# Patient Record
Sex: MALE | Race: BLACK OR AFRICAN AMERICAN | Employment: UNEMPLOYED | URBAN - METROPOLITAN AREA
[De-identification: names, ages, dates, MRNs, and addresses within clinical notes are randomized per-mention and may not be internally consistent; named-entity substitution may affect disease eponyms.]

---

## 2019-05-24 ENCOUNTER — APPOINTMENT (OUTPATIENT)
Dept: GENERAL RADIOLOGY | Age: 59
DRG: 065 | End: 2019-05-24
Attending: EMERGENCY MEDICINE
Payer: MEDICARE

## 2019-05-24 ENCOUNTER — APPOINTMENT (OUTPATIENT)
Dept: CT IMAGING | Age: 59
DRG: 065 | End: 2019-05-24
Attending: EMERGENCY MEDICINE
Payer: MEDICARE

## 2019-05-24 ENCOUNTER — HOSPITAL ENCOUNTER (INPATIENT)
Age: 59
LOS: 3 days | Discharge: HOME | DRG: 065 | End: 2019-05-27
Attending: EMERGENCY MEDICINE | Admitting: INTERNAL MEDICINE
Payer: MEDICARE

## 2019-05-24 ENCOUNTER — APPOINTMENT (OUTPATIENT)
Dept: MRI IMAGING | Age: 59
DRG: 065 | End: 2019-05-24
Attending: EMERGENCY MEDICINE
Payer: MEDICARE

## 2019-05-24 DIAGNOSIS — I10 MALIGNANT HYPERTENSION: Primary | ICD-10-CM

## 2019-05-24 DIAGNOSIS — R29.898 WEAKNESS OF RIGHT LOWER EXTREMITY: ICD-10-CM

## 2019-05-24 DIAGNOSIS — I63.9 CEREBROVASCULAR ACCIDENT (CVA), UNSPECIFIED MECHANISM (HCC): ICD-10-CM

## 2019-05-24 LAB
ALBUMIN SERPL-MCNC: 3.5 G/DL (ref 3.5–5)
ALBUMIN/GLOB SERPL: 0.7 {RATIO} (ref 1.1–2.2)
ALP SERPL-CCNC: 72 U/L (ref 45–117)
ALT SERPL-CCNC: 27 U/L (ref 12–78)
ANION GAP SERPL CALC-SCNC: 5 MMOL/L (ref 5–15)
AST SERPL-CCNC: 21 U/L (ref 15–37)
ATRIAL RATE: 85 BPM
BASOPHILS # BLD: 0.1 K/UL (ref 0–0.1)
BASOPHILS NFR BLD: 1 % (ref 0–1)
BILIRUB SERPL-MCNC: 0.6 MG/DL (ref 0.2–1)
BUN SERPL-MCNC: 12 MG/DL (ref 6–20)
BUN/CREAT SERPL: 11 (ref 12–20)
CALCIUM SERPL-MCNC: 8.6 MG/DL (ref 8.5–10.1)
CALCULATED P AXIS, ECG09: 32 DEGREES
CALCULATED R AXIS, ECG10: 0 DEGREES
CALCULATED T AXIS, ECG11: 110 DEGREES
CHLORIDE SERPL-SCNC: 105 MMOL/L (ref 97–108)
CK SERPL-CCNC: 152 U/L (ref 39–308)
CO2 SERPL-SCNC: 28 MMOL/L (ref 21–32)
CREAT SERPL-MCNC: 1.14 MG/DL (ref 0.7–1.3)
DIAGNOSIS, 93000: NORMAL
DIFFERENTIAL METHOD BLD: ABNORMAL
EOSINOPHIL # BLD: 0.2 K/UL (ref 0–0.4)
EOSINOPHIL NFR BLD: 3 % (ref 0–7)
ERYTHROCYTE [DISTWIDTH] IN BLOOD BY AUTOMATED COUNT: 15.5 % (ref 11.5–14.5)
GLOBULIN SER CALC-MCNC: 4.7 G/DL (ref 2–4)
GLUCOSE BLD STRIP.AUTO-MCNC: 100 MG/DL (ref 65–100)
GLUCOSE SERPL-MCNC: 94 MG/DL (ref 65–100)
HCT VFR BLD AUTO: 42.4 % (ref 36.6–50.3)
HGB BLD-MCNC: 13.7 G/DL (ref 12.1–17)
IMM GRANULOCYTES # BLD AUTO: 0 K/UL (ref 0–0.04)
IMM GRANULOCYTES NFR BLD AUTO: 0 % (ref 0–0.5)
INR PPP: 1.1 (ref 0.9–1.1)
LYMPHOCYTES # BLD: 1.2 K/UL (ref 0.8–3.5)
LYMPHOCYTES NFR BLD: 19 % (ref 12–49)
MAGNESIUM SERPL-MCNC: 2 MG/DL (ref 1.6–2.4)
MCH RBC QN AUTO: 27.7 PG (ref 26–34)
MCHC RBC AUTO-ENTMCNC: 32.3 G/DL (ref 30–36.5)
MCV RBC AUTO: 85.7 FL (ref 80–99)
MONOCYTES # BLD: 0.6 K/UL (ref 0–1)
MONOCYTES NFR BLD: 9 % (ref 5–13)
NEUTS SEG # BLD: 4.5 K/UL (ref 1.8–8)
NEUTS SEG NFR BLD: 68 % (ref 32–75)
NRBC # BLD: 0 K/UL (ref 0–0.01)
NRBC BLD-RTO: 0 PER 100 WBC
P-R INTERVAL, ECG05: 156 MS
PLATELET # BLD AUTO: 238 K/UL (ref 150–400)
PMV BLD AUTO: 10.3 FL (ref 8.9–12.9)
POTASSIUM SERPL-SCNC: 3.3 MMOL/L (ref 3.5–5.1)
PROT SERPL-MCNC: 8.2 G/DL (ref 6.4–8.2)
PROTHROMBIN TIME: 11.2 SEC (ref 9–11.1)
Q-T INTERVAL, ECG07: 406 MS
QRS DURATION, ECG06: 80 MS
QTC CALCULATION (BEZET), ECG08: 483 MS
RBC # BLD AUTO: 4.95 M/UL (ref 4.1–5.7)
SERVICE CMNT-IMP: NORMAL
SODIUM SERPL-SCNC: 138 MMOL/L (ref 136–145)
TROPONIN I SERPL-MCNC: 0.06 NG/ML
VENTRICULAR RATE, ECG03: 85 BPM
WBC # BLD AUTO: 6.6 K/UL (ref 4.1–11.1)

## 2019-05-24 PROCEDURE — 74011000258 HC RX REV CODE- 258: Performed by: INTERNAL MEDICINE

## 2019-05-24 PROCEDURE — 74011000258 HC RX REV CODE- 258: Performed by: EMERGENCY MEDICINE

## 2019-05-24 PROCEDURE — 94762 N-INVAS EAR/PLS OXIMTRY CONT: CPT

## 2019-05-24 PROCEDURE — 96375 TX/PRO/DX INJ NEW DRUG ADDON: CPT

## 2019-05-24 PROCEDURE — 82962 GLUCOSE BLOOD TEST: CPT

## 2019-05-24 PROCEDURE — 74011000250 HC RX REV CODE- 250: Performed by: INTERNAL MEDICINE

## 2019-05-24 PROCEDURE — 84484 ASSAY OF TROPONIN QUANT: CPT

## 2019-05-24 PROCEDURE — 83735 ASSAY OF MAGNESIUM: CPT

## 2019-05-24 PROCEDURE — 82550 ASSAY OF CK (CPK): CPT

## 2019-05-24 PROCEDURE — 85025 COMPLETE CBC W/AUTO DIFF WBC: CPT

## 2019-05-24 PROCEDURE — 65660000000 HC RM CCU STEPDOWN

## 2019-05-24 PROCEDURE — 96365 THER/PROPH/DIAG IV INF INIT: CPT

## 2019-05-24 PROCEDURE — 93005 ELECTROCARDIOGRAM TRACING: CPT

## 2019-05-24 PROCEDURE — 74011250637 HC RX REV CODE- 250/637: Performed by: INTERNAL MEDICINE

## 2019-05-24 PROCEDURE — 36415 COLL VENOUS BLD VENIPUNCTURE: CPT

## 2019-05-24 PROCEDURE — 80053 COMPREHEN METABOLIC PANEL: CPT

## 2019-05-24 PROCEDURE — 99285 EMERGENCY DEPT VISIT HI MDM: CPT

## 2019-05-24 PROCEDURE — 74011250636 HC RX REV CODE- 250/636: Performed by: INTERNAL MEDICINE

## 2019-05-24 PROCEDURE — 70450 CT HEAD/BRAIN W/O DYE: CPT

## 2019-05-24 PROCEDURE — 74011000250 HC RX REV CODE- 250: Performed by: EMERGENCY MEDICINE

## 2019-05-24 PROCEDURE — 85610 PROTHROMBIN TIME: CPT

## 2019-05-24 PROCEDURE — 71046 X-RAY EXAM CHEST 2 VIEWS: CPT

## 2019-05-24 RX ORDER — POTASSIUM CHLORIDE 750 MG/1
40 TABLET, FILM COATED, EXTENDED RELEASE ORAL
Status: COMPLETED | OUTPATIENT
Start: 2019-05-24 | End: 2019-05-24

## 2019-05-24 RX ORDER — IBUPROFEN 200 MG
400 TABLET ORAL
COMMUNITY
End: 2019-05-27

## 2019-05-24 RX ORDER — CLOPIDOGREL BISULFATE 75 MG/1
75 TABLET ORAL DAILY
Status: DISCONTINUED | OUTPATIENT
Start: 2019-05-25 | End: 2019-05-24

## 2019-05-24 RX ORDER — ACETAMINOPHEN 650 MG/1
650 SUPPOSITORY RECTAL
Status: DISCONTINUED | OUTPATIENT
Start: 2019-05-24 | End: 2019-05-27 | Stop reason: HOSPADM

## 2019-05-24 RX ORDER — ACETAMINOPHEN 325 MG/1
650 TABLET ORAL
Status: DISCONTINUED | OUTPATIENT
Start: 2019-05-24 | End: 2019-05-27 | Stop reason: HOSPADM

## 2019-05-24 RX ORDER — HEPARIN SODIUM 5000 [USP'U]/ML
5000 INJECTION, SOLUTION INTRAVENOUS; SUBCUTANEOUS EVERY 8 HOURS
Status: DISCONTINUED | OUTPATIENT
Start: 2019-05-24 | End: 2019-05-27 | Stop reason: HOSPADM

## 2019-05-24 RX ORDER — AMLODIPINE BESYLATE 5 MG/1
5 TABLET ORAL DAILY
Status: DISCONTINUED | OUTPATIENT
Start: 2019-05-24 | End: 2019-05-25

## 2019-05-24 RX ORDER — SODIUM CHLORIDE 0.9 % (FLUSH) 0.9 %
5-40 SYRINGE (ML) INJECTION AS NEEDED
Status: DISCONTINUED | OUTPATIENT
Start: 2019-05-24 | End: 2019-05-27 | Stop reason: HOSPADM

## 2019-05-24 RX ORDER — LABETALOL HYDROCHLORIDE 5 MG/ML
5 INJECTION, SOLUTION INTRAVENOUS
Status: DISCONTINUED | OUTPATIENT
Start: 2019-05-24 | End: 2019-05-25

## 2019-05-24 RX ORDER — SODIUM CHLORIDE 0.9 % (FLUSH) 0.9 %
5-40 SYRINGE (ML) INJECTION AS NEEDED
Status: DISCONTINUED | OUTPATIENT
Start: 2019-05-24 | End: 2019-05-26 | Stop reason: SDUPTHER

## 2019-05-24 RX ORDER — LABETALOL HYDROCHLORIDE 5 MG/ML
20 INJECTION, SOLUTION INTRAVENOUS
Status: COMPLETED | OUTPATIENT
Start: 2019-05-24 | End: 2019-05-24

## 2019-05-24 RX ORDER — SODIUM CHLORIDE 0.9 % (FLUSH) 0.9 %
5-40 SYRINGE (ML) INJECTION EVERY 8 HOURS
Status: DISCONTINUED | OUTPATIENT
Start: 2019-05-24 | End: 2019-05-26 | Stop reason: SDUPTHER

## 2019-05-24 RX ORDER — SODIUM CHLORIDE 0.9 % (FLUSH) 0.9 %
5-40 SYRINGE (ML) INJECTION EVERY 8 HOURS
Status: DISCONTINUED | OUTPATIENT
Start: 2019-05-24 | End: 2019-05-27 | Stop reason: HOSPADM

## 2019-05-24 RX ORDER — GUAIFENESIN 100 MG/5ML
81 LIQUID (ML) ORAL DAILY
Status: DISCONTINUED | OUTPATIENT
Start: 2019-05-24 | End: 2019-05-27 | Stop reason: HOSPADM

## 2019-05-24 RX ORDER — ACETAMINOPHEN 500 MG
1000 TABLET ORAL
COMMUNITY

## 2019-05-24 RX ORDER — NICARDIPINE HYDROCHLORIDE 0.1 MG/ML
5-15 INJECTION INTRAVENOUS
Status: DISCONTINUED | OUTPATIENT
Start: 2019-05-24 | End: 2019-05-24 | Stop reason: SDUPTHER

## 2019-05-24 RX ORDER — ONDANSETRON 2 MG/ML
4 INJECTION INTRAMUSCULAR; INTRAVENOUS
Status: DISCONTINUED | OUTPATIENT
Start: 2019-05-24 | End: 2019-05-27 | Stop reason: HOSPADM

## 2019-05-24 RX ORDER — ATORVASTATIN CALCIUM 40 MG/1
40 TABLET, FILM COATED ORAL
Status: DISCONTINUED | OUTPATIENT
Start: 2019-05-24 | End: 2019-05-27 | Stop reason: HOSPADM

## 2019-05-24 RX ORDER — METOPROLOL TARTRATE 25 MG/1
25 TABLET, FILM COATED ORAL EVERY 12 HOURS
Status: DISCONTINUED | OUTPATIENT
Start: 2019-05-24 | End: 2019-05-24

## 2019-05-24 RX ORDER — METOPROLOL TARTRATE 25 MG/1
25 TABLET, FILM COATED ORAL EVERY 12 HOURS
Status: DISCONTINUED | OUTPATIENT
Start: 2019-05-24 | End: 2019-05-26

## 2019-05-24 RX ADMIN — ASPIRIN 81 MG 81 MG: 81 TABLET ORAL at 19:02

## 2019-05-24 RX ADMIN — AMLODIPINE BESYLATE 5 MG: 5 TABLET ORAL at 19:02

## 2019-05-24 RX ADMIN — SODIUM CHLORIDE 7.5 MG/HR: 900 INJECTION, SOLUTION INTRAVENOUS at 23:47

## 2019-05-24 RX ADMIN — SODIUM CHLORIDE 10 MG/HR: 900 INJECTION, SOLUTION INTRAVENOUS at 19:06

## 2019-05-24 RX ADMIN — HEPARIN SODIUM 5000 UNITS: 5000 INJECTION INTRAVENOUS; SUBCUTANEOUS at 21:50

## 2019-05-24 RX ADMIN — Medication 10 ML: at 21:51

## 2019-05-24 RX ADMIN — ATORVASTATIN CALCIUM 40 MG: 40 TABLET, FILM COATED ORAL at 21:50

## 2019-05-24 RX ADMIN — SODIUM CHLORIDE 7.5 MG/HR: 900 INJECTION, SOLUTION INTRAVENOUS at 19:19

## 2019-05-24 RX ADMIN — Medication 10 ML: at 14:00

## 2019-05-24 RX ADMIN — METOPROLOL TARTRATE 25 MG: 25 TABLET ORAL at 19:02

## 2019-05-24 RX ADMIN — SODIUM CHLORIDE 7.5 MG/HR: 900 INJECTION, SOLUTION INTRAVENOUS at 18:39

## 2019-05-24 RX ADMIN — Medication 10 ML: at 21:50

## 2019-05-24 RX ADMIN — LABETALOL HYDROCHLORIDE 20 MG: 5 INJECTION, SOLUTION INTRAVENOUS at 14:34

## 2019-05-24 RX ADMIN — POTASSIUM CHLORIDE 40 MEQ: 750 TABLET, EXTENDED RELEASE ORAL at 19:02

## 2019-05-24 RX ADMIN — SODIUM CHLORIDE 10 MG/HR: 900 INJECTION, SOLUTION INTRAVENOUS at 23:24

## 2019-05-24 RX ADMIN — SODIUM CHLORIDE 5 MG/HR: 900 INJECTION, SOLUTION INTRAVENOUS at 16:41

## 2019-05-24 NOTE — ED TRIAGE NOTES
64 y/o AA male with only known PMH of hypertension presents with the cc of sudden onset of dizziness (worse with orthostatic positional changes) and right leg \"numbness\" and weakness yesterday around 2:00 pm. He is currently on no medications and has not seen a health care provider in over one year. He denies drugs/EtOH. Patient also denies chest pain, palpitations, diplopia, visual field cuts, head trauma, fever/chills, dysphagia, and syncopal spells.   Initial NIH scored as a 4:  1 pt - sensory loss (right lower extremity)  1 pt - slight drift in right arm  1 pt - slight drift in right leg  1 pt - mild aphasia/slurred speech    CODE S level II called on arrival

## 2019-05-24 NOTE — ED PROVIDER NOTES
EMERGENCY DEPARTMENT HISTORY AND PHYSICAL EXAM          Date: 5/24/2019  Patient Name: Janki Galarza    History of Presenting Illness     Chief Complaint   Patient presents with    Dysarthria    Dizziness    Extremity Weakness       History Provided By: Patient and EMS    HPI: Janki Galarza is a 63 y.o. male, pmhx hypertension, sleep apnea, asthma, who presents via EMS to the ED c/o elevated blood pressure  Patient states he has been feeling dizzy and off since yesterday afternoon around 2 PM.  He notes he is having some weakness in his right leg with some weird sensations as well as some mild trouble with his speech and gait. He denies any headache, blurry vision, chest pain, abdominal pain, nausea vomiting diarrhea he notes that he has been told in the past he had blood pressure and he was supposed to follow-up with his primary care physician but he did not. He does note he went to a doctor's office 1 year ago and there is no comment made of his blood pressure. PCP: None    Allergies: NSAIDs  Social Hx: previous tobacco, -EtOH, -current Illicit Drugs    There are no other complaints, changes, or physical findings at this time.      Current Facility-Administered Medications   Medication Dose Route Frequency Provider Last Rate Last Dose    sodium chloride (NS) flush 5-40 mL  5-40 mL IntraVENous Q8H Shawna Causey MD   10 mL at 05/24/19 1400    sodium chloride (NS) flush 5-40 mL  5-40 mL IntraVENous PRN Shawna Causey MD        sodium chloride (NS) flush 5-40 mL  5-40 mL IntraVENous Q8H Sera Perez MD        sodium chloride (NS) flush 5-40 mL  5-40 mL IntraVENous PRN Nabil Brunson MD        ondansetron [facility]) injection 4 mg  4 mg IntraVENous Q6H PRN Nabil Brunson MD        labetalol (NORMODYNE;TRANDATE) injection 5 mg  5 mg IntraVENous Q10MIN PRN Nabil Brunson MD        acetaminophen (TYLENOL) tablet 650 mg  650 mg Oral Q4H PRN Jose L Borrego MD LOKESH        Or    acetaminophen (TYLENOL) solution 650 mg  650 mg Per NG tube Q4H PRN Brooke Perez MD        Or    acetaminophen (TYLENOL) suppository 650 mg  650 mg Rectal Q4H PRN Jennifer Galicia MD        [START ON 2019] clopidogrel (PLAVIX) tablet 75 mg  75 mg Oral DAILY Brooke Perez MD        niCARdipine (CARDENE) 25 mg in 0.9% sodium chloride 250 mL infusion  5-15 mg/hr IntraVENous TITRATE Brooke Perez MD        heparin (porcine) injection 5,000 Units  5,000 Units SubCUTAneous Q8H Brooke Perez MD        amLODIPine (NORVASC) tablet 5 mg  5 mg Oral DAILY Brooke Perez MD        metoprolol tartrate (LOPRESSOR) tablet 25 mg  25 mg Oral Q12H Brooke Perez MD        atorvastatin (LIPITOR) tablet 40 mg  40 mg Oral QHS Jennifer Galicia MD         Current Outpatient Medications   Medication Sig Dispense Refill    ibuprofen (MOTRIN) 200 mg tablet Take 400 mg by mouth every six (6) hours as needed for Pain.  acetaminophen (TYLENOL) 500 mg tablet Take 1,000 mg by mouth every six (6) hours as needed for Pain.  aspirin/caffeine (BC PO) Take 1 Tab by mouth daily as needed for Pain (Aches).  Multivit-Iron-Min-Folic Acid (CENTRUM COMPLETE) 18-0.4 mg Tab Take 1 Tab by mouth daily. Past History     Past Medical History:  Past Medical History:   Diagnosis Date    Asthma     Bell's palsy     Gum disease     Hypertension     Obstructive sleep apnea (adult) (pediatric) 2013    Obstructive sleep apnea on CPAP 2013       Past Surgical History:  No past surgical history on file.     Family History:  Family History   Problem Relation Age of Onset    Hypertension Father     Alcohol abuse Father        Social History:  Social History     Tobacco Use    Smoking status: Former Smoker     Packs/day: 1.00     Last attempt to quit: 1995     Years since quittin.3    Smokeless tobacco: Never Used   Substance Use Topics  Alcohol use: No    Drug use: Yes     Types: Marijuana     Comment: as teenager        Allergies: Allergies   Allergen Reactions    Nsaids (Non-Steroidal Anti-Inflammatory Drug) Other (comments)     wheezing         Review of Systems   Review of Systems   Constitutional: Negative for activity change, appetite change, chills, fever and unexpected weight change. HENT: Negative for congestion. Eyes: Negative for pain and visual disturbance. Respiratory: Negative for cough and shortness of breath. Cardiovascular: Negative for chest pain. Gastrointestinal: Negative for abdominal pain, diarrhea, nausea and vomiting. Genitourinary: Negative for dysuria. Musculoskeletal: Negative for back pain. Skin: Negative for rash. Neurological: Positive for dizziness, weakness and numbness. Negative for headaches. Physical Exam   Physical Exam   Constitutional: He is oriented to person, place, and time. He appears well-developed and well-nourished. This is a tall overweight middle-aged male currently in mild to moderate distress due to symptoms   HENT:   Head: Normocephalic and atraumatic. Mouth/Throat: Oropharynx is clear and moist.   Eyes: Pupils are equal, round, and reactive to light. Conjunctivae and EOM are normal. Right eye exhibits no discharge. Left eye exhibits no discharge. Neck: Normal range of motion. Neck supple. Cardiovascular: Normal rate, regular rhythm and normal heart sounds. No murmur heard. Pulmonary/Chest: Effort normal and breath sounds normal. No respiratory distress. He has no wheezes. He has no rales. Abdominal: Soft. Bowel sounds are normal. He exhibits no distension. There is no tenderness. Musculoskeletal: Normal range of motion. He exhibits no edema. Neurological: He is alert and oriented to person, place, and time. No cranial nerve deficit or sensory deficit. He exhibits normal muscle tone.  Coordination and gait (Noted to be dragging his right leg with difficulty picking it up) abnormal. GCS eye subscore is 4. GCS verbal subscore is 5. GCS motor subscore is 6. Right lower extremity with mild weakness compared to the left side partially 2-3 out of 5   Skin: Skin is warm and dry. No rash noted. He is not diaphoretic. Nursing note and vitals reviewed.     Diagnostic Study Results     Labs -     Recent Results (from the past 12 hour(s))   GLUCOSE, POC    Collection Time: 05/24/19  1:29 PM   Result Value Ref Range    Glucose (POC) 100 65 - 100 mg/dL    Performed by Carmen Garcia    EKG, 12 LEAD, INITIAL    Collection Time: 05/24/19  1:53 PM   Result Value Ref Range    Ventricular Rate 85 BPM    Atrial Rate 85 BPM    P-R Interval 156 ms    QRS Duration 80 ms    Q-T Interval 406 ms    QTC Calculation (Bezet) 483 ms    Calculated P Axis 32 degrees    Calculated R Axis 0 degrees    Calculated T Axis 110 degrees    Diagnosis       Normal sinus rhythm  Possible Left atrial enlargement  Left ventricular hypertrophy  T wave abnormality, consider lateral ischemia  Prolonged QT  When compared with ECG of 13-FEB-2013 15:38,  premature ventricular complexes are no longer present  premature atrial complexes are no longer present  T wave inversion now evident in Lateral leads     TROPONIN I    Collection Time: 05/24/19  1:55 PM   Result Value Ref Range    Troponin-I, Qt. 0.06 (H) <0.05 ng/mL   CBC WITH AUTOMATED DIFF    Collection Time: 05/24/19  1:55 PM   Result Value Ref Range    WBC 6.6 4.1 - 11.1 K/uL    RBC 4.95 4.10 - 5.70 M/uL    HGB 13.7 12.1 - 17.0 g/dL    HCT 42.4 36.6 - 50.3 %    MCV 85.7 80.0 - 99.0 FL    MCH 27.7 26.0 - 34.0 PG    MCHC 32.3 30.0 - 36.5 g/dL    RDW 15.5 (H) 11.5 - 14.5 %    PLATELET 257 666 - 796 K/uL    MPV 10.3 8.9 - 12.9 FL    NRBC 0.0 0  WBC    ABSOLUTE NRBC 0.00 0.00 - 0.01 K/uL    NEUTROPHILS 68 32 - 75 %    LYMPHOCYTES 19 12 - 49 %    MONOCYTES 9 5 - 13 %    EOSINOPHILS 3 0 - 7 %    BASOPHILS 1 0 - 1 %    IMMATURE GRANULOCYTES 0 0.0 - 0.5 % ABS. NEUTROPHILS 4.5 1.8 - 8.0 K/UL    ABS. LYMPHOCYTES 1.2 0.8 - 3.5 K/UL    ABS. MONOCYTES 0.6 0.0 - 1.0 K/UL    ABS. EOSINOPHILS 0.2 0.0 - 0.4 K/UL    ABS. BASOPHILS 0.1 0.0 - 0.1 K/UL    ABS. IMM. GRANS. 0.0 0.00 - 0.04 K/UL    DF AUTOMATED     METABOLIC PANEL, COMPREHENSIVE    Collection Time: 05/24/19  1:55 PM   Result Value Ref Range    Sodium 138 136 - 145 mmol/L    Potassium 3.3 (L) 3.5 - 5.1 mmol/L    Chloride 105 97 - 108 mmol/L    CO2 28 21 - 32 mmol/L    Anion gap 5 5 - 15 mmol/L    Glucose 94 65 - 100 mg/dL    BUN 12 6 - 20 MG/DL    Creatinine 1.14 0.70 - 1.30 MG/DL    BUN/Creatinine ratio 11 (L) 12 - 20      GFR est AA >60 >60 ml/min/1.73m2    GFR est non-AA >60 >60 ml/min/1.73m2    Calcium 8.6 8.5 - 10.1 MG/DL    Bilirubin, total 0.6 0.2 - 1.0 MG/DL    ALT (SGPT) 27 12 - 78 U/L    AST (SGOT) 21 15 - 37 U/L    Alk. phosphatase 72 45 - 117 U/L    Protein, total 8.2 6.4 - 8.2 g/dL    Albumin 3.5 3.5 - 5.0 g/dL    Globulin 4.7 (H) 2.0 - 4.0 g/dL    A-G Ratio 0.7 (L) 1.1 - 2.2     CK W/ REFLX CKMB    Collection Time: 05/24/19  1:55 PM   Result Value Ref Range     39 - 308 U/L   MAGNESIUM    Collection Time: 05/24/19  1:55 PM   Result Value Ref Range    Magnesium 2.0 1.6 - 2.4 mg/dL   PROTHROMBIN TIME + INR    Collection Time: 05/24/19  1:55 PM   Result Value Ref Range    INR 1.1 0.9 - 1.1      Prothrombin time 11.2 (H) 9.0 - 11.1 sec       Radiologic Studies -   XR CHEST PA LAT   Final Result   No acute intrathoracic disease. CT CODE NEURO HEAD WO CONTRAST   Final Result   1.    1. Small age-indeterminate infarct in the left basal ganglia/internal capsule,   but new since 2012. Consider brain MRI for further evaluation. 2. 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 Mild   chronic microvascular ischemic disease.       23X            MRI BRAIN W WO CONT    (Results Pending)   MRA BRAIN WO CONT    (Results Pending)     CT Results  (Last 48 hours) 05/24/19 1344  CT CODE NEURO HEAD WO CONTRAST Final result    Impression:  1.    1. Small age-indeterminate infarct in the left basal ganglia/internal capsule,   but new since 2012. Consider brain MRI for further evaluation. 2. 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 Mild   chronic microvascular ischemic disease. 23X               Narrative:  EXAM:  CT CODE NEURO HEAD WO CONTRAST       INDICATION:   htn dizziness weakness       COMPARISON: CT head 12/6/2012. TECHNIQUE: Unenhanced CT of the head was performed using 5 mm images. Brain and   bone windows were generated. CT dose reduction was achieved through use of a   standardized protocol tailored for this examination and automatic exposure   control for dose modulation. FINDINGS:   The ventricles are normal in size and position. Basilar cisterns are patent. No   midline shift. Small age-indeterminate infarct in the left basal   ganglia/internal capsule, new since prior exam. No evidence of acute hemorrhage. 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 Scattered periventricular and deep white matter hypodensities, consistent with   mild chronic microangiopathic ischemic changes. The paranasal sinuses, mastoid air cells, and middle ears are clear. The orbital   contents are within normal limits. There are no significant osseous or   extracranial soft tissue lesions. CXR Results  (Last 48 hours)               05/24/19 1415  XR CHEST PA LAT Final result    Impression:  No acute intrathoracic disease. Narrative:  CLINICAL HISTORY: Dizziness and hypertension    INDICATION: Dizziness       COMPARISON: 12/6/2012       FINDINGS:    PA and lateral views of the chest are obtained. The cardiopericardial silhouette is within normal limits. There is no pleural   effusion, pneumothorax or focal consolidation present. Patient is on a cardiac   monitor. Medical Decision Making   I am the first provider for this patient. I reviewed the vital signs, available nursing notes, past medical history, past surgical history, family history and social history. Vital Signs-Reviewed the patient's vital signs. Patient Vitals for the past 12 hrs:   Temp Pulse Resp BP SpO2   05/24/19 1718  68  (!) 183/94    05/24/19 1600  70 19 (!) 207/119 98 %   05/24/19 1530  67 18 (!) 216/134 96 %   05/24/19 1503  70 18 (!) 201/123 96 %   05/24/19 1330  86 22 (!) 198/110 98 %   05/24/19 1324 98.7 °F (37.1 °C) 91 23 (!) 238/144 97 %       Pulse Oximetry Analysis - 98% on RA    Cardiac Monitor:   Rate: 85bpm  Rhythm: Normal Sinus Rhythm      Records Reviewed: Nursing Notes, Old Medical Records and Ambulance Run Sheet    Provider Notes (Medical Decision Making):   MDM: Hypertensive male not currently on medication presenting with concern for malignant hypertension. Will initiate code stroke evaluation with IV antihypertensives. ED Course:   Initial assessment performed. The patients presenting problems have been discussed, and they are in agreement with the care plan formulated and outlined with them. I have encouraged them to ask questions as they arise throughout their visit. EKG interpretation: (Preliminary)  Rhythm: normal sinus rhythm; and regular . Rate (approx.): 85; Axis: normal; MS interval: normal; QRS interval: normal ; ST/T wave: T wave inversions in 1 aVL and V4 through 6 with Q waves in V1 and V2    PROGRESS NOTE:  1500  Pt continues to remain hypertensive and his CT is notable for possible new stroke versus subacute / chronic infarcts. Discussed recommendations for admission and patient's to which patient understands. Denies any new complaints. CONSULT NOTE:   15:20 PM  Abdoulaye Castellanos MD spoke with Dr. Crista Ayala,   Specialty: List  Discussed pt's hx, disposition, and available diagnostic and imaging results. Reviewed care plans.  Consultant agrees with plans as outlined. They will evaluate the patient for admission. Critical Care Time:   CRITICAL CARE NOTE :  16:00  IMPENDING DETERIORATION -Airway, Respiratory, Cardiovascular, CNS, Metabolic, Renal and Hepatic  ASSOCIATED RISK FACTORS - Hypotension, Shock, Hypoxia, Dysrhythmia, Metabolic changes and CNS Decompensation  MANAGEMENT- Bedside Assessment and Supervision of Care  INTERPRETATION -  Xrays, ECG and Blood Pressure  INTERVENTIONS - hemodynamic mngmt and Metobolic interventions  CASE REVIEW - Hospitalist, Nursing and Family  TREATMENT RESPONSE -Stable  PERFORMED BY - Self    NOTES   :  I have spent 60 minutes of critical care time involved in lab review, consultations with specialist, family decision- making, bedside attention and documentation. During this entire length of time I was immediately available to the patient. Nate Jenkins. MD [place]        Diagnosis     Clinical Impression:   1. Malignant hypertension    2. Weakness of right lower extremity        PLAN:  1. Admit to internal medicine, hospitalist service    Please note, this dictation was completed with [employer], the computer voice recognition software. Quite often unanticipated grammatical, syntax, homophones, and other interpretive errors are inadvertently transcribed by the computer software. Please disregard these errors. Please excuse any errors that have escaped final proof reading.

## 2019-05-24 NOTE — PROGRESS NOTES
Pharmacy Clarification of Prior to Admission Medication Regimen     The patient was interviewed regarding clarification of the prior to admission medication regimen and was questioned regarding use of any other inhalers, topical products, over the counter medications, herbal medications, vitamin products or ophthalmic/nasal/otic medication use. Information Obtained From: Patient, NO RX Query    Pertinent Pharmacy Findings:   Updated patients preferred outpatient pharmacy to: [place] [address], [address] [facility]   Patient denied being prescribed any medications   acetaminophen (TYLENOL) 500 mg tablet, ibuprofen (MOTRIN) 200 mg tablet: Patient stated he takes both of the these for the same pain scale. Patient stated there is no reason why he would lamonte eone over the other    PTA medication list was corrected to the following:     Prior to Admission Medications   Prescriptions Last Dose Informant Patient Reported? Taking? Multivit-Iron-Min-Folic Acid (CENTRUM COMPLETE) 18-0.4 mg Tab 5/23/2019 at Unknown time Self Yes Yes   Sig: Take 1 Tab by mouth daily. acetaminophen (TYLENOL) 500 mg tablet 5/17/2019 at Unknown time Self Yes Yes   Sig: Take 1,000 mg by mouth every six (6) hours as needed for Pain. aspirin/caffeine (BC PO) 5/21/2019 at Unknown time Self Yes Yes   Sig: Take 1 Tab by mouth daily as needed for Pain (Aches). ibuprofen (MOTRIN) 200 mg tablet 5/17/2019 at Unknown time Self Yes Yes   Sig: Take 400 mg by mouth every six (6) hours as needed for Pain.       Facility-Administered Medications: None          Thank you,  Sinai Martinez CPhT  Medication History Pharmacy Technician

## 2019-05-24 NOTE — PROGRESS NOTES
Speech path   Speech was consulted for dysphagia screening. We do not perform screenings. If an evaluation is needed please order speech evaluation. I'm sorry for the confusion.    Alice Valencia, SLP

## 2019-05-24 NOTE — ROUTINE PROCESS
-Please complete MRI History and Safety Screening Form for this patient using [facility] only under Orders Requiring a Screening Form: 
 

## 2019-05-24 NOTE — PROGRESS NOTES

## 2019-05-24 NOTE — H&P
Hospitalist Admission Note    NAME:  Ca Butts   :   1960   MRN:   [id number]     Date of admit: 2019    PCP: None    Assessment/Plan:     Acute left MCA CVA (cerebral vascular accident) ([facility] [address] POA  Presented with right weakness x 2 days  Admit head CT with age indeterminant infarct left basal ganglia, chronic infarcts as well  Stroke risk factors: HTN, tobacco, elevated cholesterol  Admit to stepdown on nicardipine, tele to monitor for atrial fib  ASA(tolerated in past), not taking regularly at home  MRI/MRA of brain and neck  Echo TTE  Carotid dopplers  Check HgBa1c and lipid panel  Lipitor pending the lipid panel  PRN labetalol if marked HTN  Dysphagia screen before PO intake, speech consult  We discussed importance of risk factor modification to prevent further strokes:   Control DM and HTN, statin if LDL elevated, no smoking  Neurology consult in AM  PT/OT consults    Hypertensive emergency 238/144 POA in setting of acute stroke  No BP meds in years, no medical follow up in years, no PCP  Stepdown  IV nicardipine to keep SBP < 200(stroke symptoms for 2 days)  Start Po norvasc and metoprolol, increase as needed  Start to wean nicardipine  PRN labetalol  Dangers of untreated HTN d/w patient at length    Hypokalemia K 3.0 POA  PO KCL  Recheck in Am    Mildly elevated troponin POA  Suspect due to marked HTN  BP control, recheck in Am  EKG with TWI laterally    MOISES prior on CPAP POA  Not using at home  Referral as outpatient for sleep study  Can exacerbate HTN    Reported NSAID allergy POA  States he has taken aspirin without problems    Morbid Obesity POA Body mass index is 40.56 kg/m². Given the patient's current clinical presentation, I have a high level of concern for decompensation if discharged from the emergency department. My assessment of this patient's clinical condition and my plan of care is as noted above.     DVT prophylaxis with heparin SQ    Code status: full code  NOK: wife    History     CHIEF COMPLAINT: \"my right side has been weak for the past 2 days\"    HISTORY OF PRESENT ILLNESS:  50-year-old black male    Currently no PCP, has not seen a doctor in years  Has not taken any blood pressure medications in over 2 years  History of prior strokes approximately 10 years ago  Not taking aspirin regularly    Notes approximately 2 days ago he was \"feeling sick\"  Was having difficulty moving his right arm and leg.  They felt weak and heavy  Was able to walk but had to drag his right leg  No associated speech changes or visual changes or vertigo  Mild to moderate bilateral frontal headache x days  No chest pain, shortness of breath, nausea vomiting, diarrhea, abdominal pain, fevers, chills  Became concerned he was \"having a stroke\", came to the emergency room    Emergency room  Blood pressure markedly elevated to 238/144  4/5 right-sided weakness in arm and leg  Speech was fluent, smile symmetric  Head CT scan showed age-indeterminate infarct in the left basal ganglia          Also showed chronic infarct in the left corpus callosum   Started on nicardipine drip  Symptoms lasting greater than 2 days, not felt to be candidate for acute interventions by the ED staff  Patient listed with NSAID allergy but notes he is taking aspirin without problems  We will called to admit the patient      Past Medical History:   Diagnosis Date    Asthma     Bell's palsy     Gum disease     Hypertension     Obstructive sleep apnea (adult) (pediatric) 2013    Obstructive sleep apnea on CPAP 2013        Social History     Tobacco Use    Smoking status: Former Smoker     Packs/day: 1.00     Last attempt to quit: 1995     Years since quittin.3    Smokeless tobacco: Never Used   Substance Use Topics    Alcohol use: No    loves with wife    Family History   Problem Relation Age of Onset    Hypertension Father     Alcohol abuse Father     Denies family history of stroke    Allergies Allergen Reactions    Nsaids (Non-Steroidal Anti-Inflammatory Drug) Other (comments)     wheezing        Prior to Admission medications    Medication Sig Start Date End Date Taking? Authorizing Provider   ibuprofen (MOTRIN) 200 mg tablet Take 400 mg by mouth every six (6) hours as needed for Pain. Yes Provider, Historical   acetaminophen (TYLENOL) 500 mg tablet Take 1,000 mg by mouth every six (6) hours as needed for Pain. Yes Provider, Historical   aspirin/caffeine (BC PO) Take 1 Tab by mouth daily as needed for Pain (Aches). Yes Provider, Historical   Multivit-Iron-Min-Folic Acid (CENTRUM COMPLETE) 18-0.4 mg Tab Take 1 Tab by mouth daily.  13  Yes Sachin Jennings MD       Review of symptoms:     POSITIVE= Bold  Negative = not bold  General:  fever, chills, sweats  Eyes:    blurred vision, eye pain, double vision  ENT:    Coryza, sore throat, trouble swallowing  Respiratory:   cough, sputum, SOB  Cardiology:   chest pain, orthopnea, PND, edema  Gastrointestinal:  abdominal pain , N/V, diarrhea, constipation, melena or BRBPR  Genitourinary:  Urgency, dysuria, hematuria  Muskuloskeletal :  Joint redness, swelling or acute joint pain, myalgias  Hematology:  easy bruising, nose or gum bleeding  Dermatological: rash, ulceration  Endocrine:   Polyuria or polydipsia, heat or hold intolerance  Neurological:  Headache, right sided weakness     Speech difficulties, memory loss  Psychological: depression, agitation      Objective:   VITALS:    Patient Vitals for the past 24 hrs:   Temp Pulse Resp BP SpO2   19 1600  70 19 (!) 207/119 98 %   19 1530  67 18 (!) 216/134 96 %   19 1503  70 18 (!) 201/123 96 %   19 1330  86 22 (!) 198/110 98 %   19 1324 98.7 °F (37.1 °C) 91 23 (!) 238/144 97 %     Temp (24hrs), Av.7 °F (37.1 °C), Min:98.7 °F (37.1 °C), Max:98.7 °F (37.1 °C)      O2 Device: Room air    Wt Readings from Last 12 Encounters:   19 121 kg (266 lb 12.1 oz) 04/24/13 106 kg (233 lb 9.6 oz)   03/18/13 107.1 kg (236 lb 3.2 oz)   02/27/13 109.3 kg (241 lb)   02/20/13 108 kg (238 lb)   02/18/13 108.5 kg (239 lb 3.2 oz)   02/14/13 108.8 kg (239 lb 13.8 oz)   02/13/13 109.8 kg (242 lb)   02/08/13 109.3 kg (241 lb)   02/04/13 110 kg (242 lb 9.6 oz)   02/01/13 110.7 kg (244 lb)   12/06/12 112.7 kg (248 lb 7.3 oz)         PHYSICAL EXAM:   General:    Alert, cooperative in no distress     HEENT: Normocephalic, atraumatic    PERRL, Sclera no icterus    Nasal mucosa without masses or discharge  Hearing intact to voice    Oropharynx without erythema or exudate Pink MM  Neck:  No meningismus, trachea midline, no carotid bruits     Thyroid not enlarged, no nodules or tenderness  Lungs:   Clear to auscultation bilaterally. No wheezing or rales    No accessory muscle use or retractions. Heart:   Regular rate and rhythm,  no murmur or gallop. No LE edema     Dorsal pedis, Posterior tibial and radial pulses 2+ and symmetric  Abdomen:   Soft, non-tender. Not distended. Bowel sounds normal.     No masses, No Hepatosplenomegaly, No Rebound or guarding  Lymph nodes: No cervical or inguinal JAVID  Musculoskeletal:  No Joint swelling, erythema, warmth. No Cyanosis or clubbing  Skin:      No rashes  No Ulcers.       Not Jaundiced   No nodules or thickening    Capillary refill normal  Neurologic: Alert and oriented X 3, follows commands     Speech fluent    Cranial nerves 2 to 12 intact    Mild right pronator drift    Mild right arm and leg weakness       LAB DATA REVIEWED:    Recent Results (from the past 12 hour(s))   GLUCOSE, POC    Collection Time: 05/24/19  1:29 PM   Result Value Ref Range    Glucose (POC) 100 65 - 100 mg/dL    Performed by Aminata Ravi    EKG, 12 LEAD, INITIAL    Collection Time: 05/24/19  1:53 PM   Result Value Ref Range    Ventricular Rate 85 BPM    Atrial Rate 85 BPM    P-R Interval 156 ms    QRS Duration 80 ms    Q-T Interval 406 ms    QTC Calculation (Bezet) 483 ms Calculated P Axis 32 degrees    Calculated R Axis 0 degrees    Calculated T Axis 110 degrees    Diagnosis       Normal sinus rhythm  Possible Left atrial enlargement  Left ventricular hypertrophy  T wave abnormality, consider lateral ischemia  Prolonged QT  When compared with ECG of 13-FEB-2013 15:38,  premature ventricular complexes are no longer present  premature atrial complexes are no longer present  T wave inversion now evident in Lateral leads     TROPONIN I    Collection Time: 05/24/19  1:55 PM   Result Value Ref Range    Troponin-I, Qt. 0.06 (H) <0.05 ng/mL   CBC WITH AUTOMATED DIFF    Collection Time: 05/24/19  1:55 PM   Result Value Ref Range    WBC 6.6 4.1 - 11.1 K/uL    RBC 4.95 4.10 - 5.70 M/uL    HGB 13.7 12.1 - 17.0 g/dL    HCT 42.4 36.6 - 50.3 %    MCV 85.7 80.0 - 99.0 FL    MCH 27.7 26.0 - 34.0 PG    MCHC 32.3 30.0 - 36.5 g/dL    RDW 15.5 (H) 11.5 - 14.5 %    PLATELET 511 757 - 205 K/uL    MPV 10.3 8.9 - 12.9 FL    NRBC 0.0 0  WBC    ABSOLUTE NRBC 0.00 0.00 - 0.01 K/uL    NEUTROPHILS 68 32 - 75 %    LYMPHOCYTES 19 12 - 49 %    MONOCYTES 9 5 - 13 %    EOSINOPHILS 3 0 - 7 %    BASOPHILS 1 0 - 1 %    IMMATURE GRANULOCYTES 0 0.0 - 0.5 %    ABS. NEUTROPHILS 4.5 1.8 - 8.0 K/UL    ABS. LYMPHOCYTES 1.2 0.8 - 3.5 K/UL    ABS. MONOCYTES 0.6 0.0 - 1.0 K/UL    ABS. EOSINOPHILS 0.2 0.0 - 0.4 K/UL    ABS. BASOPHILS 0.1 0.0 - 0.1 K/UL    ABS. IMM.  GRANS. 0.0 0.00 - 0.04 K/UL    DF AUTOMATED     METABOLIC PANEL, COMPREHENSIVE    Collection Time: 05/24/19  1:55 PM   Result Value Ref Range    Sodium 138 136 - 145 mmol/L    Potassium 3.3 (L) 3.5 - 5.1 mmol/L    Chloride 105 97 - 108 mmol/L    CO2 28 21 - 32 mmol/L    Anion gap 5 5 - 15 mmol/L    Glucose 94 65 - 100 mg/dL    BUN 12 6 - 20 MG/DL    Creatinine 1.14 0.70 - 1.30 MG/DL    BUN/Creatinine ratio 11 (L) 12 - 20      GFR est AA >60 >60 ml/min/1.73m2    GFR est non-AA >60 >60 ml/min/1.73m2    Calcium 8.6 8.5 - 10.1 MG/DL    Bilirubin, total 0.6 0.2 - 1.0 MG/DL    ALT (SGPT) 27 12 - 78 U/L    AST (SGOT) 21 15 - 37 U/L    Alk. phosphatase 72 45 - 117 U/L    Protein, total 8.2 6.4 - 8.2 g/dL    Albumin 3.5 3.5 - 5.0 g/dL    Globulin 4.7 (H) 2.0 - 4.0 g/dL    A-G Ratio 0.7 (L) 1.1 - 2.2     CK W/ REFLX CKMB    Collection Time: 05/24/19  1:55 PM   Result Value Ref Range     39 - 308 U/L   MAGNESIUM    Collection Time: 05/24/19  1:55 PM   Result Value Ref Range    Magnesium 2.0 1.6 - 2.4 mg/dL   PROTHROMBIN TIME + INR    Collection Time: 05/24/19  1:55 PM   Result Value Ref Range    INR 1.1 0.9 - 1.1      Prothrombin time 11.2 (H) 9.0 - 11.1 sec       EKG as read by me shows NSR rate 85, normal axis, no LVH, TWI laterally    Admit CXR read by radiology FINDINGS:   PA and lateral views of the chest are obtained. The cardiopericardial silhouette is within normal limits. There is no pleural  effusion, pneumothorax or focal consolidation present. Patient is on a cardiac  monitor. IMPRESSION  No acute intrathoracic disease. CT scan head read by by radiology FINDINGS:  The ventricles are normal in size and position. Basilar cisterns are patent. No  midline shift. Small age-indeterminate infarct in the left basal  ganglia/internal capsule, new since prior exam. No evidence of acute hemorrhage. 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 Scattered periventricular and deep white matter hypodensities, consistent with  mild chronic microangiopathic ischemic changes. The paranasal sinuses, mastoid air cells, and middle ears are clear. The orbital  contents are within normal limits. There are no significant osseous or  extracranial soft tissue lesions. IMPRESSION  1. Small age-indeterminate infarct in the left basal ganglia/internal capsule,  but new since 2012. Consider brain MRI for further evaluation. 2. 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 Mild  chronic microvascular ischemic disease.       I saw the patient personally, took a history and did a complete physical exam at the bedside. I performed complex decision making in coming up with a diagnostic and treatment plan for the patient. I reviewed the patient's past medical records, current laboratory and radiology results, and actual Xray films/EKG. I have also discussed this case with the involved ED physician.     Care Plan discussed with:    Patient, ED Doc    Risk of deterioration:  High    Total Time Coordinating Admission:  65   minutes    Total Critical Care Time:         Abdulaziz Levy MD

## 2019-05-24 NOTE — ED NOTES
TRANSFER - OUT REPORT:    Verbal report given to Nahid Camejo RN (name) on Soledad Gonzalez  being transferred to #[id number] (unit) for routine progression of care       Report consisted of patients Situation, Background, Assessment and   Recommendations(SBAR). Information from the following report(s) SBAR, ED Summary, [facility] and Recent Results was reviewed with the receiving nurse. Lines:   Peripheral IV 05/24/19 Right Antecubital (Active)   Site Assessment Clean, dry, & intact 5/24/2019  2:00 PM   Phlebitis Assessment 0 5/24/2019  2:00 PM   Infiltration Assessment 0 5/24/2019  2:00 PM   Dressing Status Clean, dry, & intact 5/24/2019  2:00 PM   Hub Color/Line Status Capped;Flushed 5/24/2019  2:00 PM        Opportunity for questions and clarification was provided.       Patient transported with:   Registered Nurse  Tech
Yes

## 2019-05-25 ENCOUNTER — APPOINTMENT (OUTPATIENT)
Dept: NON INVASIVE DIAGNOSTICS | Age: 59
DRG: 065 | End: 2019-05-25
Attending: INTERNAL MEDICINE
Payer: MEDICARE

## 2019-05-25 ENCOUNTER — APPOINTMENT (OUTPATIENT)
Dept: MRI IMAGING | Age: 59
DRG: 065 | End: 2019-05-25
Attending: EMERGENCY MEDICINE
Payer: MEDICARE

## 2019-05-25 LAB
ALBUMIN SERPL-MCNC: 3.3 G/DL (ref 3.5–5)
ALBUMIN/GLOB SERPL: 0.8 {RATIO} (ref 1.1–2.2)
ALP SERPL-CCNC: 67 U/L (ref 45–117)
ALT SERPL-CCNC: 23 U/L (ref 12–78)
ANION GAP SERPL CALC-SCNC: 7 MMOL/L (ref 5–15)
APPEARANCE UR: CLEAR
AST SERPL-CCNC: 18 U/L (ref 15–37)
BACTERIA URNS QL MICRO: NEGATIVE /HPF
BASOPHILS # BLD: 0 K/UL (ref 0–0.1)
BASOPHILS NFR BLD: 0 % (ref 0–1)
BILIRUB SERPL-MCNC: 0.5 MG/DL (ref 0.2–1)
BILIRUB UR QL: NEGATIVE
BUN SERPL-MCNC: 10 MG/DL (ref 6–20)
BUN/CREAT SERPL: 11 (ref 12–20)
CALCIUM SERPL-MCNC: 8.5 MG/DL (ref 8.5–10.1)
CHLORIDE SERPL-SCNC: 104 MMOL/L (ref 97–108)
CHOLEST SERPL-MCNC: 205 MG/DL
CO2 SERPL-SCNC: 28 MMOL/L (ref 21–32)
COLOR UR: NORMAL
CREAT SERPL-MCNC: 0.93 MG/DL (ref 0.7–1.3)
DIFFERENTIAL METHOD BLD: ABNORMAL
EOSINOPHIL # BLD: 0.3 K/UL (ref 0–0.4)
EOSINOPHIL NFR BLD: 4 % (ref 0–7)
EPITH CASTS URNS QL MICRO: NORMAL /LPF
ERYTHROCYTE [DISTWIDTH] IN BLOOD BY AUTOMATED COUNT: 15.5 % (ref 11.5–14.5)
EST. AVERAGE GLUCOSE BLD GHB EST-MCNC: 123 MG/DL
GLOBULIN SER CALC-MCNC: 4.4 G/DL (ref 2–4)
GLUCOSE SERPL-MCNC: 94 MG/DL (ref 65–100)
GLUCOSE UR STRIP.AUTO-MCNC: NEGATIVE MG/DL
HBA1C MFR BLD: 5.9 % (ref 4.2–6.3)
HCT VFR BLD AUTO: 43.4 % (ref 36.6–50.3)
HDLC SERPL-MCNC: 41 MG/DL
HDLC SERPL: 5 {RATIO} (ref 0–5)
HEMOCCULT STL QL: NEGATIVE
HGB BLD-MCNC: 13.9 G/DL (ref 12.1–17)
HGB UR QL STRIP: NEGATIVE
HYALINE CASTS URNS QL MICRO: NORMAL /LPF (ref 0–5)
IMM GRANULOCYTES # BLD AUTO: 0 K/UL (ref 0–0.04)
IMM GRANULOCYTES NFR BLD AUTO: 0 % (ref 0–0.5)
KETONES UR QL STRIP.AUTO: NEGATIVE MG/DL
LDLC SERPL CALC-MCNC: 144.8 MG/DL (ref 0–100)
LEUKOCYTE ESTERASE UR QL STRIP.AUTO: NEGATIVE
LIPID PROFILE,FLP: ABNORMAL
LYMPHOCYTES # BLD: 1.6 K/UL (ref 0.8–3.5)
LYMPHOCYTES NFR BLD: 24 % (ref 12–49)
MCH RBC QN AUTO: 27.7 PG (ref 26–34)
MCHC RBC AUTO-ENTMCNC: 32 G/DL (ref 30–36.5)
MCV RBC AUTO: 86.6 FL (ref 80–99)
MONOCYTES # BLD: 0.5 K/UL (ref 0–1)
MONOCYTES NFR BLD: 8 % (ref 5–13)
NEUTS SEG # BLD: 4.3 K/UL (ref 1.8–8)
NEUTS SEG NFR BLD: 64 % (ref 32–75)
NITRITE UR QL STRIP.AUTO: NEGATIVE
NRBC # BLD: 0 K/UL (ref 0–0.01)
NRBC BLD-RTO: 0 PER 100 WBC
PH UR STRIP: 7 [PH] (ref 5–8)
PLATELET # BLD AUTO: 223 K/UL (ref 150–400)
PMV BLD AUTO: 10.1 FL (ref 8.9–12.9)
POTASSIUM SERPL-SCNC: 3.3 MMOL/L (ref 3.5–5.1)
PROT SERPL-MCNC: 7.7 G/DL (ref 6.4–8.2)
PROT UR STRIP-MCNC: NEGATIVE MG/DL
RBC # BLD AUTO: 5.01 M/UL (ref 4.1–5.7)
RBC #/AREA URNS HPF: NORMAL /HPF (ref 0–5)
SODIUM SERPL-SCNC: 139 MMOL/L (ref 136–145)
SP GR UR REFRACTOMETRY: 1.01 (ref 1–1.03)
TRIGL SERPL-MCNC: 96 MG/DL (ref ?–150)
TROPONIN I SERPL-MCNC: 0.05 NG/ML
TROPONIN I SERPL-MCNC: 0.06 NG/ML
TSH SERPL DL<=0.05 MIU/L-ACNC: 1.59 UIU/ML (ref 0.36–3.74)
UA: UC IF INDICATED,UAUC: NORMAL
UROBILINOGEN UR QL STRIP.AUTO: 1 EU/DL (ref 0.2–1)
VLDLC SERPL CALC-MCNC: 19.2 MG/DL
WBC # BLD AUTO: 6.8 K/UL (ref 4.1–11.1)
WBC URNS QL MICRO: NORMAL /HPF (ref 0–4)

## 2019-05-25 PROCEDURE — 74011250637 HC RX REV CODE- 250/637: Performed by: INTERNAL MEDICINE

## 2019-05-25 PROCEDURE — 97530 THERAPEUTIC ACTIVITIES: CPT | Performed by: OCCUPATIONAL THERAPIST

## 2019-05-25 PROCEDURE — 93306 TTE W/DOPPLER COMPLETE: CPT

## 2019-05-25 PROCEDURE — 74011000250 HC RX REV CODE- 250: Performed by: INTERNAL MEDICINE

## 2019-05-25 PROCEDURE — 81001 URINALYSIS AUTO W/SCOPE: CPT

## 2019-05-25 PROCEDURE — 97165 OT EVAL LOW COMPLEX 30 MIN: CPT | Performed by: OCCUPATIONAL THERAPIST

## 2019-05-25 PROCEDURE — A9575 INJ GADOTERATE MEGLUMI 0.1ML: HCPCS | Performed by: INTERNAL MEDICINE

## 2019-05-25 PROCEDURE — 97161 PT EVAL LOW COMPLEX 20 MIN: CPT

## 2019-05-25 PROCEDURE — 70544 MR ANGIOGRAPHY HEAD W/O DYE: CPT

## 2019-05-25 PROCEDURE — 70553 MRI BRAIN STEM W/O & W/DYE: CPT

## 2019-05-25 PROCEDURE — 80053 COMPREHEN METABOLIC PANEL: CPT

## 2019-05-25 PROCEDURE — 74011000258 HC RX REV CODE- 258: Performed by: INTERNAL MEDICINE

## 2019-05-25 PROCEDURE — 74011250636 HC RX REV CODE- 250/636: Performed by: INTERNAL MEDICINE

## 2019-05-25 PROCEDURE — 84484 ASSAY OF TROPONIN QUANT: CPT

## 2019-05-25 PROCEDURE — 84443 ASSAY THYROID STIM HORMONE: CPT

## 2019-05-25 PROCEDURE — 97116 GAIT TRAINING THERAPY: CPT

## 2019-05-25 PROCEDURE — 65660000000 HC RM CCU STEPDOWN

## 2019-05-25 PROCEDURE — 36415 COLL VENOUS BLD VENIPUNCTURE: CPT

## 2019-05-25 PROCEDURE — 80061 LIPID PANEL: CPT

## 2019-05-25 PROCEDURE — 82272 OCCULT BLD FECES 1-3 TESTS: CPT

## 2019-05-25 PROCEDURE — 83036 HEMOGLOBIN GLYCOSYLATED A1C: CPT

## 2019-05-25 PROCEDURE — 85025 COMPLETE CBC W/AUTO DIFF WBC: CPT

## 2019-05-25 RX ORDER — HYDRALAZINE HYDROCHLORIDE 20 MG/ML
10 INJECTION INTRAMUSCULAR; INTRAVENOUS
Status: DISCONTINUED | OUTPATIENT
Start: 2019-05-25 | End: 2019-05-27 | Stop reason: HOSPADM

## 2019-05-25 RX ORDER — LABETALOL HYDROCHLORIDE 5 MG/ML
20 INJECTION, SOLUTION INTRAVENOUS
Status: DISCONTINUED | OUTPATIENT
Start: 2019-05-25 | End: 2019-05-25

## 2019-05-25 RX ORDER — AMLODIPINE BESYLATE 5 MG/1
10 TABLET ORAL DAILY
Status: DISCONTINUED | OUTPATIENT
Start: 2019-05-26 | End: 2019-05-27 | Stop reason: HOSPADM

## 2019-05-25 RX ORDER — GADOTERATE MEGLUMINE 376.9 MG/ML
20 INJECTION INTRAVENOUS
Status: COMPLETED | OUTPATIENT
Start: 2019-05-25 | End: 2019-05-25

## 2019-05-25 RX ORDER — AMLODIPINE BESYLATE 5 MG/1
5 TABLET ORAL DAILY
Status: COMPLETED | OUTPATIENT
Start: 2019-05-25 | End: 2019-05-25

## 2019-05-25 RX ADMIN — ACETAMINOPHEN 650 MG: 325 TABLET ORAL at 22:33

## 2019-05-25 RX ADMIN — AMLODIPINE BESYLATE 5 MG: 5 TABLET ORAL at 11:01

## 2019-05-25 RX ADMIN — Medication 10 ML: at 21:26

## 2019-05-25 RX ADMIN — HEPARIN SODIUM 5000 UNITS: 5000 INJECTION INTRAVENOUS; SUBCUTANEOUS at 05:31

## 2019-05-25 RX ADMIN — HYDRALAZINE HYDROCHLORIDE 10 MG: 20 INJECTION INTRAMUSCULAR; INTRAVENOUS at 21:25

## 2019-05-25 RX ADMIN — AMLODIPINE BESYLATE 5 MG: 5 TABLET ORAL at 08:32

## 2019-05-25 RX ADMIN — METOPROLOL TARTRATE 25 MG: 25 TABLET ORAL at 21:26

## 2019-05-25 RX ADMIN — ATORVASTATIN CALCIUM 40 MG: 40 TABLET, FILM COATED ORAL at 21:26

## 2019-05-25 RX ADMIN — ASPIRIN 81 MG 81 MG: 81 TABLET ORAL at 08:32

## 2019-05-25 RX ADMIN — Medication 10 ML: at 15:28

## 2019-05-25 RX ADMIN — GADOTERATE MEGLUMINE 20 ML: 376.9 INJECTION INTRAVENOUS at 14:19

## 2019-05-25 RX ADMIN — HEPARIN SODIUM 5000 UNITS: 5000 INJECTION INTRAVENOUS; SUBCUTANEOUS at 15:09

## 2019-05-25 RX ADMIN — Medication 10 ML: at 05:32

## 2019-05-25 RX ADMIN — HEPARIN SODIUM 5000 UNITS: 5000 INJECTION INTRAVENOUS; SUBCUTANEOUS at 21:25

## 2019-05-25 RX ADMIN — METOPROLOL TARTRATE 25 MG: 25 TABLET ORAL at 08:31

## 2019-05-25 RX ADMIN — SODIUM CHLORIDE 5 MG/HR: 900 INJECTION, SOLUTION INTRAVENOUS at 01:12

## 2019-05-25 NOTE — PROGRESS NOTES
1900: The bed side shift change report given to  Aidan  by Stan  (offgoing nurse). Report included the following information SBAR, Kardex, Intake/Output, MAR, Recent Results, Med Rec Status, Cardiac Rhythm NSR and Alarm Parameters . 10:35 PM  The patient resting in bed with eyes open. No complains of pain. No signs of distress. On cardene drip for uncontrolled blood pressure, now the drip at 10mg/hr and the BP is 166/97. Helped into bed from a recliner. To continue to monitor for changes. 10:37 PM  The MRI  Not done. Dr Rebecca Gamino aware and said it can be done in the AM when the BP is under control and patient off the cardene drip. Werner Gottlieb 4:43 AM  The Cardene drip held. The Blood pressure within the normal range. Now at 143/80. Labs collected as per orders. No critical values noted. To continue to monitor     6:16 AM  The Cardene drip restarted at 5mg/hr. The BP now is 167/93. To continue to monitor.

## 2019-05-25 NOTE — PROGRESS NOTES
Accepted pt to PCU on Cardene at 5 mg and I had to titrate him up to 10 and back down to 7.5 after po B/P med's given. Then report to [facility] and dual skin done with [facility]. Pt's b/p went back up. Informed Dr Micaela Thomas that we were not able to wean Cardene yet and therefore MRI not done. He states that maybe MRI will have to wait until AM.   Pt has no neuro deficits at this time WU equally, speech is clear and pt alert and oriented x 3. Pupils equal round and reactive,tongue midline, no facial weakness. Chair alarm placed and explaned to the pt.

## 2019-05-25 NOTE — PROGRESS NOTES
Called Dr Stephani Block for P.T. Because pt now off drip has high diastolic B/P and MD stated she was ok with diastolic of 210 and that it was ok to work with pt.

## 2019-05-25 NOTE — ROUTINE PROCESS
Patient on drip and unable to come to MRI at this time No Nurse is able to come down with them at this time

## 2019-05-25 NOTE — PROGRESS NOTES
Hospitalist Progress Note    NAME: Jhonny Aldana   :  1960   MRN:  [id number]       Assessment / Plan:  Acute left MCA CVA (cerebral vascular accident) ([address] POA  Presented with right weakness x 2 days  Admit head CT with age indeterminant infarct left basal ganglia, chronic infarcts as well:  CT scan brain :  1. Small age-indeterminate infarct in the left basal ganglia/internal capsule,  but new since . Consider brain MRI for further evaluation. 2. 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 Mild  chronic microvascular ischemic disease. Stroke risk factors: HTN, tobacco, elevated cholesterol  Admitted  on nicardipine drip for SBP >238,   off cardene drip   Pt is out of 24h permissive HTN, his symtoms started 2days ago   ASA(tolerated in past), not taking regularly at home  MRI/MRA of brain and neck pending   Echo TTE done , reading pending   No need for doppler of carotids if MRA  Neck done , will DC order   HgBa1c  5.9 and lipid panel showed ldl 1.44, c/w statin   Passed dysphagia screen , c/w cardiac diet We discussed importance of risk factor modification to prevent further strokes:    Neuro on board         Hypertensive emergency 238/144 POA in setting of acute stroke resolved   No BP meds in years, no medical follow up in years, no PCP  , off cardene drip   C/w  Po norvasc and metoprolol, increase as needed  C/w prn hydralazine SBP > 170 as out of permissive HTN window  Dangers of untreated HTN d/w patient at length     Hypokalemia K POA  PO KCL  Recheck in Am     Mildly elevated troponin POA  Suspect due to marked HTN  BP control, troponin 0.06>0.06 , will check 3rd one   EKG with TWI laterally     MOISES prior on CPAP POA  Not using at home  Referral as outpatient for sleep study  Can exacerbate HTN     Reported NSAID allergy POA  States he has taken aspirin without problems  Morbid obesity     Body mass index is 40.56 kg/m².  therefore classifying patient as obese, NOS (BMI of >30 kg/m2)  -counseled exercise/diet. Patient receptive. Code status: Full  Prophylaxis: Hep SQ  Recommended Disposition:  PT, OT, RN     Subjective:     Chief Complaint / Reason for Physician Visit  FU CVA . Pt reports no complaints . Seems like his weakness is resolving . Discussed with RN events overnight. Review of Systems:  Symptom Y/N Comments  Symptom Y/N Comments   Fever/Chills n   Chest Pain n    Poor Appetite    Edema     Cough    Abdominal Pain n    Sputum n   Joint Pain     SOB/BOYCE n   Pruritis/Rash     Nausea/vomit n   Tolerating PT/OT     Diarrhea    Tolerating Diet y    Constipation    Other       Could NOT obtain due to:      Objective:     VITALS:   Last 24hrs VS reviewed since prior progress note.  Most recent are:  Patient Vitals for the past 24 hrs:   Temp Pulse Resp BP SpO2   05/25/19 0831    139/85    05/25/19 0730 97.7 °F (36.5 °C) 71 14 133/77 93 %   05/25/19 0700  73 18 (!) 150/94 93 %   05/25/19 0645  67 16 (!) 152/93 92 %   05/25/19 0630  79 16 (!) 162/102 98 %   05/25/19 0615  67 15 (!) 167/93 (!) 87 %   05/25/19 0600    (!) 148/95    05/25/19 0418    142/85    05/25/19 0415  69 15 141/80 98 %   05/25/19 0339 97.9 °F (36.6 °C) 73 18 (!) 158/95 98 %   05/24/19 2244 97.9 °F (36.6 °C) 75 18 (!) 159/96 97 %   05/24/19 2007    (!) 153/97    05/24/19 2001  67 20  97 %   05/24/19 2000    (!) 162/97    05/24/19 1948 98.2 °F (36.8 °C) 68 21 (!) 171/116 98 %   05/24/19 1931    (!) 187/111    05/24/19 1919    163/87    05/24/19 1916  85 19  99 %   05/24/19 1915    163/87    05/24/19 1903  78 15 (!) 186/173 98 %   05/24/19 1839 98 °F (36.7 °C) 84 16 (!) 192/104 96 %   05/24/19 1745  78 20 (!) 175/95 95 %   05/24/19 1730  75 19 (!) 173/94 95 %   05/24/19 1718  68  (!) 183/94    05/24/19 1600  70 19 (!) 207/119 98 %   05/24/19 1530  67 18 (!) 216/134 96 %   05/24/19 1503  70 18 (!) 201/123 96 %   05/24/19 1330  86 22 (!) 198/110 98 %   05/24/19 1324 98.7 °F (37.1 °C) 91 23 (!) 238/144 97 %       Intake/Output Summary (Last 24 hours) at 5/25/2019 0943  Last data filed at 5/25/2019 0339  Gross per 24 hour   Intake    Output 1300 ml   Net -1300 ml        PHYSICAL EXAM:  General: WD, WN. Alert, cooperative, no acute distress    EENT:  EOMI. Anicteric sclerae. MMM  Resp:  CTA bilaterally, no wheezing or rales. No accessory muscle use  CV:  Regular  rhythm,  No edema  GI:  Soft, Non distended, Non tender.  +Bowel sounds  Neurologic:  Alert and oriented X 3, normal speech, RUE/LLE 5/5 LUE/LLE 5/5 , Cn2-12 intact   Psych:   Good insight. Not anxious nor agitated  Skin:  No rashes. No jaundice    Reviewed most current lab test results and cultures  YES  Reviewed most current radiology test results   YES  Review and summation of old records today    NO  Reviewed patient's current orders and MAR    YES  PMH/SH reviewed - no change compared to H&P  ________________________________________________________________________  Care Plan discussed with:    Comments   Patient x    Family      RN x    Care Manager     Consultant                        Multidiciplinary team rounds were held today with , nursing, pharmacist and clinical coordinator. Patient's plan of care was discussed; medications were reviewed and discharge planning was addressed. ________________________________________________________________________  Total NON critical care TIME: 25   Minutes    Total CRITICAL CARE TIME Spent:   Minutes non procedure based      Comments   >50% of visit spent in counseling and coordination of care x    ________________________________________________________________________  Yahaira Gresham MD     Procedures: see electronic medical records for all procedures/Xrays and details which were not copied into this note but were reviewed prior to creation of Plan.       LABS:  I reviewed today's most current labs and imaging studies.   Pertinent labs include:  Recent Labs     05/25/19  0423 05/24/19  1355   WBC 6.8 6.6   HGB 13.9 13.7   HCT 43.4 42.4    238     Recent Labs     05/25/19  0423 05/24/19  1355    138   K 3.3* 3.3*    105   CO2 28 28   GLU 94 94   BUN 10 12   CREA 0.93 1.14   CA 8.5 8.6   MG  --  2.0   ALB 3.3* 3.5   TBILI 0.5 0.6   SGOT 18 21   ALT 23 27   INR  --  1.1       Signed: Pedrito Malone MD

## 2019-05-25 NOTE — PROGRESS NOTES
Problem: Self Care Deficits Care Plan (Adult)  Goal: *Acute Goals and Plan of Care (Insert Text)  Description  Occupational Therapy Goals  Initiated 5/25/2019  1. Patient will perform standing grooming using RUE as his dominant with independence within 7 day(s). 2.  Patient will perform upper body dressing using RUE as his dominant with independence within 7 day(s). 3.  Patient will perform lower body dressing using RUE as his dominant with independence within 7 day(s). 4.  Patient will perform toilet transfers with independence within 7 day(s). 5.  Patient will perform all aspects of toileting using RUE as his dominant with independence within 7 day(s). 6.  Patient will perform ADL setup with independence within 7 day(s). Outcome: Progressing Towards Goal        OCCUPATIONAL THERAPY EVALUATION  Patient: Gabi Huber (9 y.o. male)  Date: 5/25/2019  Primary Diagnosis: Stroke (cerebrum) ([facility]) [I63.9]        Precautions: falls       ASSESSMENT :  Based on the objective data described below, the patient presents s/p CVA with impaired RUE and RLE coordination and mild decline in strength, RLE numbness and decreased standing balance which is impairing his functional independence. He is functioning below his independent baseline, now performing ADLs at a supervision/setup to min A level and is supervision to [place] [id number] for functional mobility. Patient will benefit from acute skilled intervention to address the above impairments and may need out patient OT and PT after discharge. Patient?s rehabilitation potential is considered to be Good  Factors which may influence rehabilitation potential include:   ? None noted  ? Mental ability/status  ? Medical condition  ? Home/family situation and support systems  ? Safety awareness  ? Pain tolerance/management  ? Other:      PLAN :  Recommendations and Planned Interventions:  ? Self Care Training                  ? Therapeutic Activities  ? Functional Mobility Training    ? Cognitive Retraining  ? Therapeutic Exercises           ? Endurance Activities  ? Balance Training                   ? Neuromuscular Re-Education  ? Visual/Perceptual Training     ? Home Safety Training  ? Patient Education                 ? Family Training/Education  ? Other (comment):    Frequency/Duration: Patient will be followed by occupational therapy 3 times a week to address goals. Discharge Recommendations: Outpatient OT and PT, pending progress. Further Equipment Recommendations for Discharge: likely none        OBJECTIVE DATA SUMMARY:     Past Medical History:   Diagnosis Date    Asthma     Bell's palsy     Gum disease     Hypertension     Obstructive sleep apnea (adult) (pediatric) 5/21/2013    Obstructive sleep apnea on CPAP 5/21/2013   No past surgical history on file. Prior Level of Function/Home Situation: Fully independent  Expanded or extensive additional review of patient history:     Home Situation  Home Environment: Private residence  # Steps to Enter: 5  Rails to Enter: Yes  One/Two Story Residence: Two story, live on 1st floor  Living Alone: No  Support Systems: Spouse/Significant Other/Partner  Patient Expects to be Discharged to[de-identified] Private residence  Current DME Used/Available at Home: None  Tub or Shower Type: Tub/Shower combination  ? Right hand dominant   ? Left hand dominant  Cognitive/Behavioral Status:  Neurologic State: Alert  Orientation Level: Oriented X4  Cognition: Appropriate decision making; Appropriate for age attention/concentration; Appropriate safety awareness; Follows commands  Perception: Appears intact  Perseveration: No perseveration noted  Safety/Judgement: Awareness of environment; Insight into deficits    Vision/Perceptual:    Acuity: Within Defined Limits       Range of Motion:  AROM: Within functional limits(LUE and LLE, Generally decreased/functional RUE/RLE)     Strength:  Strength: Within functional limits(LUE and LLE, Generally decreased/functional RUE/RLE)  Coordination:  Coordination: Within functional limits(LUE and LLE, Generally decreased/functional RUE/RLE)  Fine Motor Skills-Upper: Left Intact; Right Impaired    Gross Motor Skills-Upper: Left Intact; Right Impaired  Tone & Sensation:  Tone: Normal  Balance:  Sitting: Intact  Standing: Impaired  Standing - Static: Good  Standing - Dynamic : Fair  Functional Mobility and Transfers for ADLs:  Bed Mobility:   Presented up in chair     Sit to Supine: Supervision  Scooting: Supervision  Transfers:  Sit to Stand: Contact guard assistance     Bed to Chair: Contact guard assistance                         ADL Assessment:  Feeding: Supervision;Setup    Oral Facial Hygiene/Grooming: Stand-by assistance;Setup    Bathing: Contact guard assistance    Upper Body Dressing: Minimum assistance    Lower Body Dressing: Minimum assistance    Toileting: Minimum assistance                Functional Measure:  Barthel Index:    Bathin  Bladder: 10  Bowels: 5  Groomin  Dressin  Feedin  Mobility: 10  Stairs: 0  Toilet Use: 5  Transfer (Bed to Chair and Back): 10  Total: 50       Barthel and G-code impairment scale:  Percentage of impairment CH  0% CI  1-19% CJ  20-39% CK  40-59% CL  60-79% CM  80-99% CN  100%   Barthel Score 0-100 100 99-80 79-60 59-40 20-39 1-19   0   Barthel Score 0-20 20 17-19 13-16 9-12 5-8 1-4 0      The Barthel ADL Index: Guidelines  1. The index should be used as a record of what a patient does, not as a record of what a patient could do. 2. The main aim is to establish degree of independence from any help, physical or verbal, however minor and for whatever reason. 3. The need for supervision renders the patient not independent.   4. A patient's performance should be established using the best available evidence. Asking the patient, friends/relatives and nurses are the usual sources, but direct observation and common sense are also important. However direct testing is not needed. 5. Usually the patient's performance over the preceding 24-48 hours is important, but occasionally longer periods will be relevant. 6. Middle categories imply that the patient supplies over 50 per cent of the effort. 7. Use of aids to be independent is allowed. Ander Richards., Barthel, D.W. (7452). Functional evaluation: the Barthel Index. [address] [facility] (14)2. KADY Weber, Tierra Nunez., Alexander Tran., [place], [address] (1999). Measuring the change indisability after inpatient rehabilitation; comparison of the responsiveness of the Barthel Index and Functional [place] Measure. Journal of Neurology, Neurosurgery, and Psychiatry, 66(4), 723-817. Yoselin Maradiaga NShaniaJ.A, SANFORD Estevez, & Skyler Yen MShaniaA. (2004.) Assessment of post-stroke quality of life in cost-effectiveness studies: The usefulness of the Barthel Index and the EuroQoL-5D. Quality of Life Research, 13, 427-43       Pain:  Pain Scale 1: Numeric (0 - 10)  Pain Intensity 1: 0              Activity Tolerance:   VSS    After treatment:   ? Patient left in no apparent distress sitting up in chair  ? Patient left in no apparent distress in bed  ? Call bell left within reach  ? Nursing notified  ? Caregiver present  ? Bed alarm activated    COMMUNICATION/EDUCATION:   The patient?s plan of care was discussed with: Physical Therapist and Registered Nurse.  ? Home safety education was provided and the patient/caregiver indicated understanding. ? Patient/family have participated as able in goal setting and plan of care. ? Patient/family agree to work toward stated goals and plan of care. ? Patient understands intent and goals of therapy, but is neutral about his/her participation. ?  Patient is unable to participate in goal setting and plan of care. This patient?s plan of care is appropriate for delegation to [facility].     Thank you for this referral.  Alireza Weiss, OTR/L  Time Calculation: 22 mins

## 2019-05-25 NOTE — PROGRESS NOTES
Problem: Mobility Impaired (Adult and Pediatric)  Goal: *Acute Goals and Plan of Care (Insert Text)  Description  Physical Therapy Goals  Initiated 5/25/2019  1. Patient will move from supine to sit and sit to supine , scoot up and down and roll side to side in bed with independence within 7 day(s). 2.  Patient will transfer from bed to chair and chair to bed with independence using the least restrictive device within 7 day(s). 3.  Patient will perform sit to stand with independence within 7 day(s). 4.  Patient will ambulate with independence for 300 feet with the least restrictive device within 7 day(s). 5.  Patient will ascend/descend 5 stairs with 1 handrail(s) with independence within 7 day(s). 6.  Patient will improve Hightower Balance score by 7 points within 7 days. Outcome: Progressing Towards Goal   PHYSICAL THERAPY EVALUATION- NEURO POPULATION    Patient: Hilda Rose (35 y.o. male)  Date: 5/25/2019  Primary Diagnosis: Stroke (cerebrum) ([facility]) [I63.9]        Precautions:        ASSESSMENT :  Based on the objective data described below, the patient presents with flat affect, hypertension, mild R LE/RUE weakness, numbness/tingling in R LE, impaired higher level balance, and overall impaired functional mobility. Pt receive supine in bed, agreeable to participation with therapy. Pt assumed seated position EOB w/ intact sitting balance. MMT performed with minor weakness noted in RLE in comparison to LLE, 4/5 RLE vs 5/5 LLE. Overall pt required SB/CGAx1 during sit>>stand transfer and ambulation trial of 180ft. Pt with widened SAMANTA in addition to mildly ataxic gait pattern through R LE however no overt LOB/balance deficits noted. Balance remained intact as pt independently retrieved item from the ground. Pt scored 42/56 on Hightower Balance test, indicating that he is a low falls risk. BP elevated to 160s/110s post activity, RN notified.  Pt likely not far from his baseline level however would benefit from OP PT to address higher level balance and minor deficits in RLE strength. Patient will benefit from skilled intervention to address the above impairments. Patients rehabilitation potential is considered to be Good  Factors which may influence rehabilitation potential include:   ? None noted  ? Mental ability/status  ? Medical condition  ? Home/family situation and support systems  ? Safety awareness  ? Pain tolerance/management  ? Other:      PLAN :  Recommendations and Planned Interventions:  ?             Bed Mobility Training             ? Neuromuscular Re-Education  ? Transfer Training                   ? Orthotic/Prosthetic Training  ? Gait Training                         ? Modalities  ? Therapeutic Exercises           ? Edema Management/Control  ? Therapeutic Activities            ? Patient and Family Training/Education  ? Other (comment): stair climbing  Frequency/Duration: Patient will be followed by physical therapy 4 times a week to address goals. Discharge Recommendations: Outpatient  Further Equipment Recommendations for Discharge: None      SUBJECTIVE:   Patient stated I need my hat before I get up.     OBJECTIVE DATA SUMMARY:   HISTORY:    Past Medical History:   Diagnosis Date    Asthma     Bell's palsy     Gum disease     Hypertension     Obstructive sleep apnea (adult) (pediatric) 5/21/2013    Obstructive sleep apnea on CPAP 5/21/2013   No past surgical history on file. Prior Level of Function/Home Situation: Independent w/ ambulation and ADLs. Lives at home w/ wife. Denies history of falls.    Personal factors and/or comorbidities impacting plan of care: HTN    Home Situation  Home Environment: Private residence  # Steps to Enter: 5  Rails to Enter: Yes  One/Two Story Residence: Two story, live on 1st floor  Living Alone: No  Support Systems: Spouse/Significant Other/Partner  Patient Expects to be Discharged to[de-identified] Private residence  Current DME Used/Available at Home: None  Tub or Shower Type: Tub/Shower combination    EXAMINATION/PRESENTATION/DECISION MAKING:   Critical Behavior:  Neurologic State: Alert  Orientation Level: Oriented X4  Cognition: Appropriate decision making, Appropriate for age attention/concentration, Appropriate safety awareness, Follows commands  Safety/Judgement: Awareness of environment, Insight into deficits  Hearing: Auditory  Auditory Impairment: None  Skin:  intact  Edema: none noted   Range Of Motion:  AROM: Within functional limits(LUE and LLE, Generally decreased/functional RUE/RLE)                       Strength:    Strength: Within functional limits(LUE and LLE, Generally decreased/functional RUE/RLE)                    Tone & Sensation:   Tone: Normal              Sensation: Impaired(RLE numb for hip to foot)               Coordination:  Coordination: Within functional limits(LUE and LLE, Generally decreased/functional RUE/RLE)  Vision:   Acuity: Within Defined Limits  Functional Mobility:  Bed Mobility:        Sit to Supine: Supervision  Scooting: Supervision  Transfers:  Sit to Stand: Contact guard assistance  Stand to Sit: Contact guard assistance        Bed to Chair: Contact guard assistance              Balance:   Sitting: Intact  Standing: Impaired  Standing - Static: Good  Standing - Dynamic : Fair  Ambulation/Gait Training:  Distance (ft): 180 Feet (ft)  Assistive Device: Gait belt  Ambulation - Level of Assistance: Contact guard assistance        Gait Abnormalities: Decreased step clearance; Ataxic        Base of Support: Widened     Speed/Tiffany: Pace decreased (<100 feet/min)  Step Length: Left shortened;Right shortened                     Functional Measure  Hightower Balance Test:    Sitting to Standin  Standing Unsupported: 3  Sitting with Back Unsupported: 4  Standing to Sittin  Transfers: 1  Standing Unsupported with Eyes Closed: 3  Standing Unsupported with Feet Together: 3  Reach Forward with Outstretched Arm: 3   Object: 3  Turn to Look Over Shoulders: 4  Turn 360 Degrees: 4  Alternate Foot on Step/Stool: 3  Standing Unsupported One Foot in Front: 3  Stand on One Leg: 3  Total: 42         56=Maximum possible score;   0-20=High fall risk  21-40=Moderate fall risk   41-56=Low fall risk       Physical Therapy Evaluation Charge Determination   History Examination Presentation Decision-Making   MEDIUM  Complexity : 1-2 comorbidities / personal factors will impact the outcome/ POC  HIGH Complexity : 4+ Standardized tests and measures addressing body structure, function, activity limitation and / or participation in recreation  MEDIUM Complexity : Evolving with changing characteristics  MEDIUM Complexity : FOTO score of 26-74      Based on the above components, the patient evaluation is determined to be of the following complexity level: MEDIUM      Pain:  Pain Scale 1: Numeric (0 - 10)  Pain Intensity 1: 0              Activity Tolerance:   Hypertensive   Please refer to the flowsheet for vital signs taken during this treatment. After treatment:   ?     Patient left in no apparent distress sitting up in chair  ? Patient left in no apparent distress in bed  ? Call bell left within reach  ? Nursing notified  ? Caregiver present  ? Bed alarm activated    COMMUNICATION/EDUCATION:   The patients plan of care was discussed with: Registered Nurse. ?  Fall prevention education was provided and the patient/caregiver indicated understanding. ? Patient/family have participated as able in goal setting and plan of care. ?  Patient/family agree to work toward stated goals and plan of care. ?  Patient understands intent and goals of therapy, but is neutral about his/her participation. ? Patient is unable to participate in goal setting and plan of care.     Thank you for this referral.  Sarika Veliz, PT, DPT   Time Calculation: 18 mins

## 2019-05-25 NOTE — CONSULTS
NEUROLOGY NOTE         DATE OF CONSULTATION: 5/25/2019    CONSULTED BY: Mohamud Mendez MD    Chief Complaint   Patient presents with    Dysarthria    Dizziness    Extremity Weakness       Reason for Consult  I have been asked to see the patient in neurological consultation to render advice and opinion regarding right sided weakness    HISTORY OF PRESENT ILLNESS  Vipul Milner is a 63 y.o. male who presents to the hospital because of 2 days of right sided weakness, he denies previous problems with weakness or numbness. He does have HTN and MOISES, no history DM. He was admitted with . He now feels back to normal. He has no regular physician. ROS  POSITIVES ARE RED  Review of Systems   Constitutional: chills and fever. HENT:  ear pain. Eyes:  pain and discharge. Respiratory: cough and hemoptysis. Cardiovascular:chest pain and claudication. Gastrointestinal:  constipation and diarrhea. Genitourinary:  flank pain and hematuria. Musculoskeletal:falls and myalgias. Negative for back pain. Skin:  itching and rash. Neurological:  sensory change and focal weakness. Negative for headaches. Endo/Heme/Allergies:  environmental allergies. Does not bruise/bleed easily.    Psychiatric/Behavioral:  depression and hallucinations.           PMH  Past Medical History:   Diagnosis Date    Asthma     Bell's palsy     Gum disease     Hypertension     Obstructive sleep apnea (adult) (pediatric) 5/21/2013    Obstructive sleep apnea on CPAP 5/21/2013       FH  Family History   Problem Relation Age of Onset    Hypertension Father     Alcohol abuse Father        SH  Social History     Socioeconomic History    Marital status:      Spouse name: Not on file    Number of children: Not on file    Years of education: Not on file    Highest education level: Not on file   Tobacco Use    Smoking status: Former Smoker     Packs/day: 1.00     Last attempt to quit: 2/1/1995     Years since quittin.3    Smokeless tobacco: Never Used   Substance and Sexual Activity    Alcohol use: No    Drug use: Yes     Types: Marijuana     Comment: as teenager     Sexual activity: Yes     Partners: Female     Comment: rabia        ALLERGIES  Allergies   Allergen Reactions    Nsaids (Non-Steroidal Anti-Inflammatory Drug) Other (comments)     wheezing       PHYSICAL EXAM  EXAMINATION:   Patient Vitals for the past 24 hrs:   Temp Pulse Resp BP SpO2   19 0831    139/85    19 0730 97.7 °F (36.5 °C) 71 14 133/77 93 %   19 0700  73 18 (!) 150/94 93 %   19 0645  67 16 (!) 152/93 92 %   19 0630  79 16 (!) 162/102 98 %   19 0615  67 15 (!) 167/93 (!) 87 %   19 0600    (!) 148/95    19 0418    142/85    19 0415  69 15 141/80 98 %   19 0339 97.9 °F (36.6 °C) 73 18 (!) 158/95 98 %   19 2244 97.9 °F (36.6 °C) 75 18 (!) 159/96 97 %   19    (!) 153/97    19  67 20  97 %   19    (!) 162/97    19 1948 98.2 °F (36.8 °C) 68 21 (!) 171/116 98 %   19 1931    (!) 187/111    19 1919    163/87    19 1916  85 19  99 %   19 1915    163/87    19 1903  78 15 (!) 186/173 98 %   19 1839 98 °F (36.7 °C) 84 16 (!) 192/104 96 %   19 1745  78 20 (!) 175/95 95 %   19 1730  75 19 (!) 173/94 95 %   19 1718  68  (!) 183/94    19 1600  70 19 (!) 207/119 98 %   19 1530  67 18 (!) 216/134 96 %   19 1503  70 18 (!) 201/123 96 %   19 1330  86 22 (!) 198/110 98 %   19 1324 98.7 °F (37.1 °C) 91 23 (!) 238/144 97 %        General:   Physical Exam   CONSTITUTIONAL: Oriented to person, place, and time, appears well-developed and well-nourished. No distress. Neurological Examination:   Mental Status:  AAO x3. Speech is fluent.  Follows commands, has normal fund of knowledge, attention, short term recall, comprehension and insight. Cranial Nerves: Visual fields are full. PERRL, Extraocular movements are full. Facial sensation intact V1- V3. Facial movement intact, symmetric. Hearing intact to conversation. Palate elevates symmetrically. Shoulder shrug symmetric. Tongue midline. Motor: Strength is 5/5 in all 4 ext. Normal tone. No atrophy. Sensation: Normal to light touch    Reflexes: DTRs 2+ throughout. Plantar responses downgoing. Coordination/Cerebellar: Right hand very slightly slower than left, right dominant    Gait: Romberg is negative and casual gait is normal.     LAB DATA REVIEWED:    Results for orders placed or performed during the hospital encounter of 05/24/19   TROPONIN I   Result Value Ref Range    Troponin-I, Qt. 0.06 (H) <0.05 ng/mL   CBC WITH AUTOMATED DIFF   Result Value Ref Range    WBC 6.6 4.1 - 11.1 K/uL    RBC 4.95 4.10 - 5.70 M/uL    HGB 13.7 12.1 - 17.0 g/dL    HCT 42.4 36.6 - 50.3 %    MCV 85.7 80.0 - 99.0 FL    MCH 27.7 26.0 - 34.0 PG    MCHC 32.3 30.0 - 36.5 g/dL    RDW 15.5 (H) 11.5 - 14.5 %    PLATELET 028 565 - 257 K/uL    MPV 10.3 8.9 - 12.9 FL    NRBC 0.0 0  WBC    ABSOLUTE NRBC 0.00 0.00 - 0.01 K/uL    NEUTROPHILS 68 32 - 75 %    LYMPHOCYTES 19 12 - 49 %    MONOCYTES 9 5 - 13 %    EOSINOPHILS 3 0 - 7 %    BASOPHILS 1 0 - 1 %    IMMATURE GRANULOCYTES 0 0.0 - 0.5 %    ABS. NEUTROPHILS 4.5 1.8 - 8.0 K/UL    ABS. LYMPHOCYTES 1.2 0.8 - 3.5 K/UL    ABS. MONOCYTES 0.6 0.0 - 1.0 K/UL    ABS. EOSINOPHILS 0.2 0.0 - 0.4 K/UL    ABS. BASOPHILS 0.1 0.0 - 0.1 K/UL    ABS. IMM.  GRANS. 0.0 0.00 - 0.04 K/UL    DF AUTOMATED     METABOLIC PANEL, COMPREHENSIVE   Result Value Ref Range    Sodium 138 136 - 145 mmol/L    Potassium 3.3 (L) 3.5 - 5.1 mmol/L    Chloride 105 97 - 108 mmol/L    CO2 28 21 - 32 mmol/L    Anion gap 5 5 - 15 mmol/L    Glucose 94 65 - 100 mg/dL    BUN 12 6 - 20 MG/DL    Creatinine 1.14 0.70 - 1.30 MG/DL    BUN/Creatinine ratio 11 (L) 12 - 20      GFR est AA >60 >60 ml/min/1.73m2    GFR est non-AA >60 >60 ml/min/1.73m2    Calcium 8.6 8.5 - 10.1 MG/DL    Bilirubin, total 0.6 0.2 - 1.0 MG/DL    ALT (SGPT) 27 12 - 78 U/L    AST (SGOT) 21 15 - 37 U/L    Alk. phosphatase 72 45 - 117 U/L    Protein, total 8.2 6.4 - 8.2 g/dL    Albumin 3.5 3.5 - 5.0 g/dL    Globulin 4.7 (H) 2.0 - 4.0 g/dL    A-G Ratio 0.7 (L) 1.1 - 2.2     CK W/ REFLX CKMB   Result Value Ref Range     39 - 308 U/L   MAGNESIUM   Result Value Ref Range    Magnesium 2.0 1.6 - 2.4 mg/dL   URINALYSIS W/ REFLEX CULTURE   Result Value Ref Range    Color YELLOW/STRAW      Appearance CLEAR CLEAR      Specific gravity 1.008 1.003 - 1.030      pH (UA) 7.0 5.0 - 8.0      Protein NEGATIVE  NEG mg/dL    Glucose NEGATIVE  NEG mg/dL    Ketone NEGATIVE  NEG mg/dL    Bilirubin NEGATIVE  NEG      Blood NEGATIVE  NEG      Urobilinogen 1.0 0.2 - 1.0 EU/dL    Nitrites NEGATIVE  NEG      Leukocyte Esterase NEGATIVE  NEG      WBC 0-4 0 - 4 /hpf    RBC 0-5 0 - 5 /hpf    Epithelial cells FEW FEW /lpf    Bacteria NEGATIVE  NEG /hpf    UA:UC IF INDICATED CULTURE NOT INDICATED BY UA RESULT CNI      Hyaline cast 0-2 0 - 5 /lpf   PROTHROMBIN TIME + INR   Result Value Ref Range    INR 1.1 0.9 - 1.1      Prothrombin time 11.2 (H) 9.0 - 11.1 sec   HEMOGLOBIN A1C WITH EAG   Result Value Ref Range    Hemoglobin A1c 5.9 4.2 - 6.3 %    Est. average glucose 123 mg/dL   CBC WITH AUTOMATED DIFF   Result Value Ref Range    WBC 6.8 4.1 - 11.1 K/uL    RBC 5.01 4.10 - 5.70 M/uL    HGB 13.9 12.1 - 17.0 g/dL    HCT 43.4 36.6 - 50.3 %    MCV 86.6 80.0 - 99.0 FL    MCH 27.7 26.0 - 34.0 PG    MCHC 32.0 30.0 - 36.5 g/dL    RDW 15.5 (H) 11.5 - 14.5 %    PLATELET 988 133 - 450 K/uL    MPV 10.1 8.9 - 12.9 FL    NRBC 0.0 0  WBC    ABSOLUTE NRBC 0.00 0.00 - 0.01 K/uL    NEUTROPHILS 64 32 - 75 %    LYMPHOCYTES 24 12 - 49 %    MONOCYTES 8 5 - 13 %    EOSINOPHILS 4 0 - 7 %    BASOPHILS 0 0 - 1 %    IMMATURE GRANULOCYTES 0 0.0 - 0.5 %    ABS.  NEUTROPHILS 4.3 1.8 - 8.0 K/UL    ABS. LYMPHOCYTES 1.6 0.8 - 3.5 K/UL    ABS. MONOCYTES 0.5 0.0 - 1.0 K/UL    ABS. EOSINOPHILS 0.3 0.0 - 0.4 K/UL    ABS. BASOPHILS 0.0 0.0 - 0.1 K/UL    ABS. IMM. GRANS. 0.0 0.00 - 0.04 K/UL    DF AUTOMATED     METABOLIC PANEL, COMPREHENSIVE   Result Value Ref Range    Sodium 139 136 - 145 mmol/L    Potassium 3.3 (L) 3.5 - 5.1 mmol/L    Chloride 104 97 - 108 mmol/L    CO2 28 21 - 32 mmol/L    Anion gap 7 5 - 15 mmol/L    Glucose 94 65 - 100 mg/dL    BUN 10 6 - 20 MG/DL    Creatinine 0.93 0.70 - 1.30 MG/DL    BUN/Creatinine ratio 11 (L) 12 - 20      GFR est AA >60 >60 ml/min/1.73m2    GFR est non-AA >60 >60 ml/min/1.73m2    Calcium 8.5 8.5 - 10.1 MG/DL    Bilirubin, total 0.5 0.2 - 1.0 MG/DL    ALT (SGPT) 23 12 - 78 U/L    AST (SGOT) 18 15 - 37 U/L    Alk.  phosphatase 67 45 - 117 U/L    Protein, total 7.7 6.4 - 8.2 g/dL    Albumin 3.3 (L) 3.5 - 5.0 g/dL    Globulin 4.4 (H) 2.0 - 4.0 g/dL    A-G Ratio 0.8 (L) 1.1 - 2.2     TROPONIN I   Result Value Ref Range    Troponin-I, Qt. 0.06 (H) <0.05 ng/mL   TSH 3RD GENERATION   Result Value Ref Range    TSH 1.59 0.36 - 3.74 uIU/mL   LIPID PANEL   Result Value Ref Range    LIPID PROFILE          Cholesterol, total 205 (H) <200 MG/DL    Triglyceride 96 <150 MG/DL    HDL Cholesterol 41 MG/DL    LDL, calculated 144.8 (H) 0 - 100 MG/DL    VLDL, calculated 19.2 MG/DL    CHOL/HDL Ratio 5.0 0.0 - 5.0     GLUCOSE, POC   Result Value Ref Range    Glucose (POC) 100 65 - 100 mg/dL    Performed by Bonny Santillan    EKG, 12 LEAD, INITIAL   Result Value Ref Range    Ventricular Rate 85 BPM    Atrial Rate 85 BPM    P-R Interval 156 ms    QRS Duration 80 ms    Q-T Interval 406 ms    QTC Calculation (Bezet) 483 ms    Calculated P Axis 32 degrees    Calculated R Axis 0 degrees    Calculated T Axis 110 degrees    Diagnosis       Normal sinus rhythm  Possible Left atrial enlargement  Left ventricular hypertrophy  T wave abnormality, consider lateral ischemia  Prolonged QT  When compared with ECG of 13-FEB-2013 15:38,  premature ventricular complexes are no longer present  premature atrial complexes are no longer present  T wave inversion now evident in Lateral leads  Confirmed by Chico Curry ([id number]) on 5/24/2019 6:36:48 PM          Imaging review:     CT HEAD W/O 5/24/2019  IMPRESSION  1. Small age-indeterminate infarct in the left basal ganglia/internal capsule,  but new since 2012. Consider brain MRI for further evaluation. 2. 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 Mild  chronic microvascular ischemic disease.             Stroke workup    MRI Brain  Pending      Stroke labs:  HgBA1c    Lab Results   Component Value Date/Time    Hemoglobin A1c 5.9 05/25/2019 04:23 AM     LDL   Lab Results   Component Value Date/Time    LDL, calculated 144.8 (H) 05/25/2019 04:23 AM       HOME MEDS  Prior to Admission Medications   Prescriptions Last Dose Informant Patient Reported? Taking? Multivit-Iron-Min-Folic Acid (CENTRUM COMPLETE) 18-0.4 mg Tab 5/23/2019 at Unknown time Self Yes Yes   Sig: Take 1 Tab by mouth daily. acetaminophen (TYLENOL) 500 mg tablet 5/17/2019 at Unknown time Self Yes Yes   Sig: Take 1,000 mg by mouth every six (6) hours as needed for Pain. aspirin/caffeine (BC PO) 5/21/2019 at Unknown time Self Yes Yes   Sig: Take 1 Tab by mouth daily as needed for Pain (Aches). ibuprofen (MOTRIN) 200 mg tablet 5/17/2019 at Unknown time Self Yes Yes   Sig: Take 400 mg by mouth every six (6) hours as needed for Pain.       Facility-Administered Medications: None       CURRENT MEDS  Current Facility-Administered Medications   Medication Dose Route Frequency    [START ON 5/26/2019] amLODIPine (NORVASC) tablet 10 mg  10 mg Oral DAILY    sodium chloride (NS) flush 5-40 mL  5-40 mL IntraVENous Q8H    sodium chloride (NS) flush 5-40 mL  5-40 mL IntraVENous Q8H    heparin (porcine) injection 5,000 Units  5,000 Units SubCUTAneous Q8H    atorvastatin (LIPITOR) tablet 40 mg  40 mg Oral QHS    metoprolol tartrate (LOPRESSOR) tablet 25 mg  25 mg Oral Q12H    aspirin chewable tablet 81 mg  81 mg Oral DAILY       IMPRESSION:RECOMMENDATIONS:  Mary Ann Delgadillo is a 63 y.o. male who presents with TIA vs small Right Internal Capsule stroke, MRI BRAIN pending as is Carotid Ultrasound and 2D CARDIAC Echo. I tried to impress on this man that a MAP of 175 guaranteed a stroke if maintained, agree with ASa 81 mg/day. [place]. Garcia Ortiz MD  Neurologist      This note will not be viewable in [address]

## 2019-05-25 NOTE — PROGRESS NOTES
Pt back from MRI only slight R leg weakness when walks.   MRI called to me by radiology and now I paged Dr Lemuel Benitez informed of Left Basal infarct with no   vascular occuslion

## 2019-05-26 ENCOUNTER — APPOINTMENT (OUTPATIENT)
Dept: CT IMAGING | Age: 59
DRG: 065 | End: 2019-05-26
Attending: INTERNAL MEDICINE
Payer: MEDICARE

## 2019-05-26 LAB
ATRIAL RATE: 78 BPM
CALCULATED P AXIS, ECG09: 22 DEGREES
CALCULATED T AXIS, ECG11: 138 DEGREES
DIAGNOSIS, 93000: NORMAL
ECHO AO ROOT DIAM: 3.63 CM
ECHO AV AREA PEAK VELOCITY: 3.1 CM2
ECHO AV AREA/BSA PEAK VELOCITY: 1.3 CM2/M2
ECHO AV PEAK GRADIENT: 6.3 MMHG
ECHO AV PEAK VELOCITY: 125.69 CM/S
ECHO EST RA PRESSURE: 10 MMHG
ECHO LA AREA 4C: 21.9 CM2
ECHO LA MAJOR AXIS: 4.31 CM
ECHO LA TO AORTIC ROOT RATIO: 1.19
ECHO LA VOL 4C: 70.15 ML (ref 18–58)
ECHO LA VOLUME INDEX A4C: 30.37 ML/M2 (ref 16–28)
ECHO LV E' LATERAL VELOCITY: 8.63 CM/S
ECHO LV E' SEPTAL VELOCITY: 5.13 CM/S
ECHO LV INTERNAL DIMENSION DIASTOLIC: 5.88 CM (ref 4.2–5.9)
ECHO LV INTERNAL DIMENSION SYSTOLIC: 4.57 CM
ECHO LV IVSD: 1.46 CM (ref 0.6–1)
ECHO LV MASS 2D: 369.2 G (ref 88–224)
ECHO LV MASS INDEX 2D: 159.8 G/M2 (ref 49–115)
ECHO LV POSTERIOR WALL DIASTOLIC: 0.96 CM (ref 0.6–1)
ECHO LVOT DIAM: 2 CM
ECHO LVOT PEAK GRADIENT: 6.2 MMHG
ECHO LVOT PEAK VELOCITY: 124.14 CM/S
ECHO LVOT SV: 75.2 ML
ECHO LVOT VTI: 23.99 CM
ECHO MV A VELOCITY: 44.44 CM/S
ECHO MV AREA VTI: 3.7 CM2
ECHO MV E DECELERATION TIME (DT): 197.7 MS
ECHO MV E VELOCITY: 67.26 CM/S
ECHO MV E/A RATIO: 1.51
ECHO MV E/E' LATERAL: 7.79
ECHO MV E/E' RATIO (AVERAGED): 10.45
ECHO MV E/E' SEPTAL: 13.11
ECHO MV MAX VELOCITY: 70.94 CM/S
ECHO MV MEAN GRADIENT: 0.9 MMHG
ECHO MV PEAK GRADIENT: 2 MMHG
ECHO MV REGURGITANT PEAK GRADIENT: 88.2 MMHG
ECHO MV REGURGITANT PEAK VELOCITY: 469.64 CM/S
ECHO MV REGURGITANT VTIA: 184.17 CM
ECHO MV VTI: 20.46 CM
ECHO PULMONARY ARTERY SYSTOLIC PRESSURE (PASP): 36.6 MMHG
ECHO RA AREA 4C: 12.75 CM2
ECHO RA VOLUME: 29.8 ML
ECHO RIGHT VENTRICULAR SYSTOLIC PRESSURE (RVSP): 36.6 MMHG
ECHO TV REGURGITANT MAX VELOCITY: 257.78 CM/S
ECHO TV REGURGITANT PEAK GRADIENT: 26.6 MMHG
P-R INTERVAL, ECG05: 174 MS
Q-T INTERVAL, ECG07: 388 MS
QRS DURATION, ECG06: 80 MS
QTC CALCULATION (BEZET), ECG08: 442 MS
VENTRICULAR RATE, ECG03: 78 BPM

## 2019-05-26 PROCEDURE — 74011250637 HC RX REV CODE- 250/637: Performed by: INTERNAL MEDICINE

## 2019-05-26 PROCEDURE — 65660000000 HC RM CCU STEPDOWN

## 2019-05-26 PROCEDURE — 92610 EVALUATE SWALLOWING FUNCTION: CPT

## 2019-05-26 PROCEDURE — 74011250636 HC RX REV CODE- 250/636: Performed by: INTERNAL MEDICINE

## 2019-05-26 PROCEDURE — 94760 N-INVAS EAR/PLS OXIMETRY 1: CPT

## 2019-05-26 PROCEDURE — 74011636320 HC RX REV CODE- 636/320: Performed by: INTERNAL MEDICINE

## 2019-05-26 PROCEDURE — 70498 CT ANGIOGRAPHY NECK: CPT

## 2019-05-26 RX ORDER — SODIUM CHLORIDE 0.9 % (FLUSH) 0.9 %
10 SYRINGE (ML) INJECTION
Status: COMPLETED | OUTPATIENT
Start: 2019-05-26 | End: 2019-05-26

## 2019-05-26 RX ORDER — METOPROLOL TARTRATE 50 MG/1
50 TABLET ORAL EVERY 12 HOURS
Status: DISCONTINUED | OUTPATIENT
Start: 2019-05-26 | End: 2019-05-27 | Stop reason: HOSPADM

## 2019-05-26 RX ADMIN — HYDRALAZINE HYDROCHLORIDE 10 MG: 20 INJECTION INTRAMUSCULAR; INTRAVENOUS at 16:53

## 2019-05-26 RX ADMIN — HEPARIN SODIUM 5000 UNITS: 5000 INJECTION INTRAVENOUS; SUBCUTANEOUS at 13:29

## 2019-05-26 RX ADMIN — ASPIRIN 81 MG 81 MG: 81 TABLET ORAL at 09:21

## 2019-05-26 RX ADMIN — AMLODIPINE BESYLATE 10 MG: 5 TABLET ORAL at 09:20

## 2019-05-26 RX ADMIN — Medication 10 ML: at 13:29

## 2019-05-26 RX ADMIN — Medication 10 ML: at 21:51

## 2019-05-26 RX ADMIN — METOPROLOL TARTRATE 50 MG: 50 TABLET ORAL at 21:51

## 2019-05-26 RX ADMIN — Medication 10 ML: at 15:35

## 2019-05-26 RX ADMIN — IOPAMIDOL 100 ML: 755 INJECTION, SOLUTION INTRAVENOUS at 15:36

## 2019-05-26 RX ADMIN — Medication 10 ML: at 06:13

## 2019-05-26 RX ADMIN — METOPROLOL TARTRATE 25 MG: 25 TABLET ORAL at 09:20

## 2019-05-26 RX ADMIN — HEPARIN SODIUM 5000 UNITS: 5000 INJECTION INTRAVENOUS; SUBCUTANEOUS at 21:51

## 2019-05-26 RX ADMIN — ATORVASTATIN CALCIUM 40 MG: 40 TABLET, FILM COATED ORAL at 21:51

## 2019-05-26 RX ADMIN — HYDRALAZINE HYDROCHLORIDE 10 MG: 20 INJECTION INTRAMUSCULAR; INTRAVENOUS at 23:51

## 2019-05-26 RX ADMIN — HEPARIN SODIUM 5000 UNITS: 5000 INJECTION INTRAVENOUS; SUBCUTANEOUS at 06:13

## 2019-05-26 NOTE — PROGRESS NOTES
1900: The bedside  shift change report given to Aidan (oncoming nurse) by Audra Valentino  (offgoing nurse). Report included the following information SBAR, Kardex, Intake/Output, MAR, Recent Results, Med Rec Status, Cardiac Rhythm NSR and Alarm Parameters . 8:27 PM  The patient resting in bed with eyes open. He is off Cardene drip now. The BP is 170/100. Will administered 10mg of hydralazine. He is to be transferred to Neuro telemetry after 9PM meds. 8:57 PM  TRANSFER - OUT REPORT:    Verbal report given to Utedon Quesada (name) on Jhonny Aldana  being transferred to Neuro Telemetry (unit) for routine progression of care       Report consisted of patients Situation, Background, Assessment and   Recommendations(SBAR). Information from the following report(s) SBAR, ED Summary, Intake/Output, MAR, Recent Results, Med Rec Status, Cardiac Rhythm NSR and Alarm Parameters  was reviewed with the receiving nurse. Lines:   Peripheral IV 05/24/19 Right Antecubital (Active)   Site Assessment Clean, dry, & intact 5/25/2019  7:30 PM   Phlebitis Assessment 0 5/25/2019  7:30 PM   Infiltration Assessment 0 5/25/2019  7:30 PM   Dressing Status Clean, dry, & intact 5/25/2019  7:30 PM   Dressing Type Tape;Transparent 5/25/2019  7:30 PM   Hub Color/Line Status Infusing 5/25/2019  7:30 PM   Action Taken Open ports on tubing capped 5/25/2019  7:30 PM   Alcohol Cap Used Yes 5/25/2019  7:30 PM        Opportunity for questions and clarification was provided.

## 2019-05-26 NOTE — PROGRESS NOTES
Speech Pathology bedside swallow evaluation/discharge  Patient: Kailey Mcarthur (2 y.o. male)  Date: 5/26/2019  Primary Diagnosis: Stroke (cerebrum) [facility]) [I63.9]       Precautions:       ASSESSMENT :  Based on the objective data described below, the patient presents with no oral or pharyngeal dysphagia. Timely and complete mastication of solids. Pharyngeal swallow initiation is timely and hyolaryngeal elevation/excursion functional via palpation. No overt s/s aspiration with any consistency. No changes in speech/language. Skilled acute therapy provided by a speech-language pathologist is not indicated at this time. PLAN :  Recommendations:  Regular/thin  Discharge Recommendations: None     SUBJECTIVE:   Patient stated my right arm is still a little weak. OBJECTIVE:     Past Medical History:   Diagnosis Date    Asthma     Bell's palsy     Gum disease     Hypertension     Obstructive sleep apnea (adult) (pediatric) 5/21/2013    Obstructive sleep apnea on CPAP 5/21/2013   No past surgical history on file.   Prior Level of Function/Home Situation:   Home Situation  Home Environment: Private residence  # Steps to Enter: 5  Rails to Enter: Yes  One/Two Story Residence: Two story, live on 1st floor  Living Alone: No  Support Systems: Spouse/Significant Other/Partner  Patient Expects to be Discharged to[de-identified] Private residence  Current DME Used/Available at Home: None  Tub or Shower Type: Tub/Shower combination  Diet prior to admission: regular/thin  Current Diet:  Regular/thin   Cognitive and Communication Status:  Neurologic State: Alert  Orientation Level: Oriented X4  Cognition: Appropriate decision making  Perception: Appears intact  Perseveration: No perseveration noted  Safety/Judgement: Awareness of environment  Oral Assessment:  Oral Assessment  Labial: No impairment  Dentition: Natural  Oral Hygiene: (wfl)  Lingual: No impairment  Velum: Unable to visualize  Mandible: No impairment  P.O. Trials:  Patient Position: (up in bed)  Vocal quality prior to P.O.: No impairment  Consistency Presented: Thin liquid; Solid;Puree  How Presented: Successive swallows;Straw     Bolus Acceptance: No impairment  Bolus Formation/Control: No impairment     Propulsion: No impairment  Oral Residue: None  Initiation of Swallow: No impairment  Laryngeal Elevation: Functional  Aspiration Signs/Symptoms: None  Pharyngeal Phase Characteristics: No impairment, issues, or problems   Effective Modifications: None  Cues for Modifications: None       Oral Phase Severity: No impairment  Pharyngeal Phase Severity : No impairment  NOMS:   The NOMS functional outcome measure was used to quantify this patient's level of swallowing impairment. Based on the NOMS, the patient was determined to be at level 7 for swallow function     NOMS Swallowing Levels:  Level 1 (CN): NPO  Level 2 (CM): NPO but takes consistency in therapy  Level 3 (CL): Takes less than 50% of nutrition p.o. and continues with nonoral feedings; and/or safe with mod cues; and/or max diet restriction  Level 4 (CK): Safe swallow but needs mod cues; and/or mod diet restriction; and/or still requires some nonoral feeding/supplements  Level 5 (CJ): Safe swallow with min diet restriction; and/or needs min cues  Level 6 (CI): Independent with p.o.; rare cues; usually self cues; may need to avoid some foods or needs extra time  Level 7 ([address] [place] [address]): Independent for all p.o.  JOSE. (2003). National Outcomes Measurement System (NOMS): Adult Speech-Language Pathology User's Guide.        Pain:  Pain Scale 1: Numeric (0 - 10)  Pain Intensity 1: 0     After treatment:   [] Patient left in no apparent distress sitting up in chair  [x] Patient left in no apparent distress in bed  [x] Call bell left within reach  [x] Nursing notified  [] Caregiver present  [] Bed alarm activated    COMMUNICATION/EDUCATION:   The patients plan of care including findings, recommendations, and recommended diet changes were discussed with: Registered Nurse.  [] Posted safety precautions in patient's room. [x] Patient/family have participated as able and agree with findings and recommendations. [] Patient is unable to participate in plan of care at this time.     Thank you for this referral.  JESSICA Andrew  Time Calculation: 10 mins

## 2019-05-26 NOTE — PROGRESS NOTES
Problem: Falls - Risk of  Goal: *Absence of Falls  Description  Document Bishop Ulrich Fall Risk and appropriate interventions in the flowsheet.   Outcome: Progressing Towards Goal     Problem: Patient Education: Go to Patient Education Activity  Goal: Patient/Family Education  Outcome: Progressing Towards Goal     Problem: Patient Education: Go to Patient Education Activity  Goal: Patient/Family Education  Outcome: Progressing Towards Goal     Problem: TIA/CVA Stroke: 0-24 hours  Goal: Off Pathway (Use only if patient is Off Pathway)  Outcome: Progressing Towards Goal

## 2019-05-26 NOTE — ROUTINE PROCESS
* No surgery found * 
* No surgery found * Bedside and Verbal shift change report given to Deidra RN (oncoming nurse) by Luz Preston RN (offgoing nurse). Report included the following information SBAR, Kardex, MAR and Recent Results. Zone Phone:   [phone] Significant changes during shift:  Admit to [facility] Patient Information Geneva Martinez 63 y.o. 
5/24/2019  1:13 PM by Aleshia Monge MD. Geneva Martinez was admitted from Home 
 
Problem List 
 
Patient Active Problem List  
 Diagnosis Date Noted  Stroke (cerebrum) ([address] 05/24/2019  Obstructive sleep apnea (adult) (pediatric) 05/21/2013  Obstructive sleep apnea on CPAP 05/21/2013  Non-compliance with treatment 02/27/2013  Mental subnormality 02/20/2013  [id number](233.4) 02/13/2013  IGT (impaired glucose tolerance) 02/13/2013  Uncontrolled hypertension 02/01/2013  Hypercholesteremia 02/01/2013 Past Medical History:  
Diagnosis Date  Asthma  Bell's palsy  Gum disease  Hypertension  Obstructive sleep apnea (adult) (pediatric) 5/21/2013  Obstructive sleep apnea on CPAP 5/21/2013 Core Measures: CVA: Yes Yes Activity Status: OOB to Chair No 
Ambulated this shift Yes Bed Rest Yes Supplemental O2: (If Applicable) NC No 
NRB No 
Venti-mask No 
On 0 Liters/min DVT prophylaxis: DVT prophylaxis Med- Yes, Heparin DVT prophylaxis SCD or VIRGIL- No  
 
Wounds: (If Applicable) Wounds- No 
 
Location 0 Patient Safety: 
 
Falls Score Total Score: 3 Safety Level_______ Bed Alarm On? No 
Sitter? No 
 
Plan for upcoming shift: Consults and safety Discharge Plan: Yes TBD Active Consults: 
IP CONSULT TO HOSPITALIST 
IP CONSULT TO NEUROLOGY

## 2019-05-26 NOTE — ROUTINE PROCESS
TRANSFER - IN REPORT: 
 
Verbal report received from [facility] on Yehuda Crum  being received from PCU for routine progression of care Report consisted of patients Situation, Background, Assessment and  
Recommendations(SBAR). Information from the following report(s) SBAR, Kardex, [facility] and Recent Results was reviewed with the receiving nurse. Opportunity for questions and clarification was provided. Assessment completed upon patients arrival to unit and care assumed.

## 2019-05-26 NOTE — PROGRESS NOTES
Reason for Admission:   stroke                   RRAT Score:          11 low risk           Plan for utilizing home health:      No current plan                    Current Advanced Directive/Advance Care Plan: none    Likelihood of Readmission:  low                         Transition of Care Plan:            1. Patient to discharge home with outpatient PT and OT  Patient will need physician scrip for outpatient therapy at discharge. 2.  Patient to follow up with PCP - Nancy Ibrahim NP at [facility] [phone]    Pt is a 63year old male admitted 5/24 for stroke. Patient alert and oriented, sitting bedside, no visitors present. Demographic information verified and correct. Insurance information verified and correct. Pt lives with wife in 2 story house with 5 steps to enter. Patient states he uses [employer], no issues with medications. Patient does not use home oxygen, is independent in ADLs, has no DME and has used home health in the past.  Patient uses [employer] for transportation. Care Management Interventions  PCP Verified by CM: Yes(pt states he sees Nancy Ibrahim NP at [facility])  Mode of Transport at Discharge:  Other (see comment)(pt uses [employer] for transportation)  Transition of Care Consult (CM Consult): Discharge Planning  Discharge Durable Medical Equipment: No  Physical Therapy Consult: Yes  Occupational Therapy Consult: Yes  Speech Therapy Consult: Yes  Current Support Network: Lives with Spouse, Own Home, Other(2 story house, 5 steps to enter)  Confirm Follow Up Transport: Other (see comment)([employer])  Plan discussed with Pt/Family/Caregiver: Yes  Discharge Location  Discharge Placement: Guilherme Kim RN, [address] [place] [address]  [phone]

## 2019-05-26 NOTE — PROGRESS NOTES
Hospitalist Progress Note    NAME: Amirah Wood   :  1960   MRN:  [id number]       Assessment / Plan:  Acute left MCA CVA (cerebral vascular accident) ([address] POA  Hypertensive emergency 238/144 POA, uncontrolled  Presented with right weakness x 2 days, improving    Admit head CT with age indeterminant infarct left basal ganglia, chronic infarcts as well:  CT scan brain :  1. Small age-indeterminate infarct in the left basal ganglia/internal capsule,  but new since . Consider brain MRI for further evaluation. 2. 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 Mild  chronic microvascular ischemic disease. MRI Small acute infarct in the left basal ganglia/internal capsule    MRA Brain without occlusion, CTA neck pending,Echo TTE done , reading pending     Stroke risk factors: HTN, tobacco, elevated cholesterol    Admitted  on nicardipine drip for SBP >238,   off cardene drip       Aspirin 81 daily, statin      HgBa1c  5.9 and lipid panel showed ldl 144, c/w statin     Neuro on board     C/w  Po norvasc and metoprolol, increase as needed    C/w prn hydralazine SBP > 170 as out of permissive HTN window    Dangers of untreated HTN d/w patient at length      Hypokalemia K POA  Replaced. Check again     Mildly elevated troponin POA  Suspect due to marked HTN  BP control, troponin 0.06>0.06 , stable  EKG with TWI laterally     MOISES prior on CPAP POA  Not using at home  Referral as outpatient for sleep study  Can exacerbate HTN     Reported NSAID allergy POA  States he has taken aspirin without problems  Morbid obesity     Body mass index is 40.56 kg/m². therefore classifying patient as obese, NOS (BMI of >30 kg/m2)  -counseled exercise/diet. Patient receptive. Code status: Full  Prophylaxis: Hep SQ  Recommended Disposition:  PT, OT, RN     Subjective:     Chief Complaint / Reason for Physician Visit  FU CVA . Pt reports no complaints . Seems like his weakness is resolving . Discussed with RN events overnight. No change. Wants to go home. Review of Systems:  Symptom Y/N Comments  Symptom Y/N Comments   Fever/Chills n   Chest Pain n    Poor Appetite    Edema     Cough    Abdominal Pain n    Sputum n   Joint Pain     SOB/BOYCE n   Pruritis/Rash     Nausea/vomit n   Tolerating PT/OT     Diarrhea    Tolerating Diet y    Constipation    Other       Could NOT obtain due to:      Objective:     VITALS:   Last 24hrs VS reviewed since prior progress note. Most recent are:  Patient Vitals for the past 24 hrs:   Temp Pulse Resp BP SpO2   05/26/19 1210 98.3 °F (36.8 °C) 61 18 (!) 155/94 97 %   05/26/19 0753 98.3 °F (36.8 °C) 63 18 139/87 98 %   05/26/19 0427 98 °F (36.7 °C) 74 20 (!) 142/106 99 %   05/25/19 2154 98.2 °F (36.8 °C) (!) 102 20 (!) 183/97 99 %   05/25/19 1932  73   97 %   05/25/19 1930 97.8 °F (36.6 °C) 73 16 (!) 158/100 98 %   05/25/19 1500 98 °F (36.7 °C) 74 16 (!) 146/93 100 %       Intake/Output Summary (Last 24 hours) at 5/26/2019 1326  Last data filed at 5/26/2019 0755  Gross per 24 hour   Intake 600 ml   Output 500 ml   Net 100 ml        PHYSICAL EXAM:  General: WD, WN. Alert, cooperative, no acute distress    EENT:  EOMI. Anicteric sclerae. MMM  Resp:  CTA bilaterally, no wheezing or rales. No accessory muscle use  CV:  Regular  rhythm,  No edema  GI:  Soft, Non distended, Non tender.  +Bowel sounds  Neurologic:  Alert and oriented X 3, normal speech, RUE/LLE 5/5 LUE/LLE 5/5 , Cn2-12 intact   Psych:   Good insight. Not anxious nor agitated  Skin:  No rashes.   No jaundice    Reviewed most current lab test results and cultures  YES  Reviewed most current radiology test results   YES  Review and summation of old records today    NO  Reviewed patient's current orders and MAR    YES  PMH/SH reviewed - no change compared to H&P  ________________________________________________________________________  Care Plan discussed with:    Comments   Patient x    Family      RN x Care Manager     Consultant                        Multidiciplinary team rounds were held today with , nursing, pharmacist and clinical coordinator. Patient's plan of care was discussed; medications were reviewed and discharge planning was addressed. ________________________________________________________________________  Total NON critical care TIME: 30   Minutes    Total CRITICAL CARE TIME Spent:   Minutes non procedure based      Comments   >50% of visit spent in counseling and coordination of care x    ________________________________________________________________________  Kathi Segura MD     Procedures: see electronic medical records for all procedures/Xrays and details which were not copied into this note but were reviewed prior to creation of Plan. LABS:  I reviewed today's most current labs and imaging studies.   Pertinent labs include:  Recent Labs     05/25/19  0423 05/24/19  1355   WBC 6.8 6.6   HGB 13.9 13.7   HCT 43.4 42.4    238     Recent Labs     05/25/19  0423 05/24/19  1355    138   K 3.3* 3.3*    105   CO2 28 28   GLU 94 94   BUN 10 12   CREA 0.93 1.14   CA 8.5 8.6   MG  --  2.0   ALB 3.3* 3.5   TBILI 0.5 0.6   SGOT 18 21   ALT 23 27   INR  --  1.1       Signed: Kathi Segura MD

## 2019-05-27 VITALS
RESPIRATION RATE: 16 BRPM | WEIGHT: 266.76 LBS | SYSTOLIC BLOOD PRESSURE: 155 MMHG | TEMPERATURE: 98.1 F | DIASTOLIC BLOOD PRESSURE: 98 MMHG | HEIGHT: 68 IN | HEART RATE: 70 BPM | BODY MASS INDEX: 40.43 KG/M2 | OXYGEN SATURATION: 99 %

## 2019-05-27 LAB
ANION GAP SERPL CALC-SCNC: 5 MMOL/L (ref 5–15)
BUN SERPL-MCNC: 14 MG/DL (ref 6–20)
BUN/CREAT SERPL: 13 (ref 12–20)
CALCIUM SERPL-MCNC: 9 MG/DL (ref 8.5–10.1)
CHLORIDE SERPL-SCNC: 105 MMOL/L (ref 97–108)
CO2 SERPL-SCNC: 27 MMOL/L (ref 21–32)
CREAT SERPL-MCNC: 1.09 MG/DL (ref 0.7–1.3)
GLUCOSE SERPL-MCNC: 102 MG/DL (ref 65–100)
POTASSIUM SERPL-SCNC: 3.4 MMOL/L (ref 3.5–5.1)
SODIUM SERPL-SCNC: 137 MMOL/L (ref 136–145)

## 2019-05-27 PROCEDURE — 74011250637 HC RX REV CODE- 250/637: Performed by: INTERNAL MEDICINE

## 2019-05-27 PROCEDURE — 74011250636 HC RX REV CODE- 250/636: Performed by: INTERNAL MEDICINE

## 2019-05-27 PROCEDURE — 80048 BASIC METABOLIC PNL TOTAL CA: CPT

## 2019-05-27 PROCEDURE — 74011250636 HC RX REV CODE- 250/636

## 2019-05-27 PROCEDURE — 94760 N-INVAS EAR/PLS OXIMETRY 1: CPT

## 2019-05-27 PROCEDURE — 36415 COLL VENOUS BLD VENIPUNCTURE: CPT

## 2019-05-27 RX ORDER — LISINOPRIL 20 MG/1
20 TABLET ORAL DAILY
Status: DISCONTINUED | OUTPATIENT
Start: 2019-05-27 | End: 2019-05-27 | Stop reason: HOSPADM

## 2019-05-27 RX ORDER — GUAIFENESIN 100 MG/5ML
81 LIQUID (ML) ORAL DAILY
Qty: 90 TAB | Refills: 1 | Status: SHIPPED | OUTPATIENT
Start: 2019-05-28

## 2019-05-27 RX ORDER — FUROSEMIDE 10 MG/ML
INJECTION INTRAMUSCULAR; INTRAVENOUS
Status: COMPLETED
Start: 2019-05-27 | End: 2019-05-27

## 2019-05-27 RX ORDER — METOPROLOL TARTRATE 50 MG/1
50 TABLET ORAL EVERY 12 HOURS
Qty: 90 TAB | Refills: 1 | Status: SHIPPED | OUTPATIENT
Start: 2019-05-27

## 2019-05-27 RX ORDER — FUROSEMIDE 10 MG/ML
40 INJECTION INTRAMUSCULAR; INTRAVENOUS ONCE
Status: COMPLETED | OUTPATIENT
Start: 2019-05-27 | End: 2019-05-27

## 2019-05-27 RX ORDER — CHLORTHALIDONE 25 MG/1
25 TABLET ORAL DAILY
Status: DISCONTINUED | OUTPATIENT
Start: 2019-05-27 | End: 2019-05-27 | Stop reason: HOSPADM

## 2019-05-27 RX ORDER — ATORVASTATIN CALCIUM 40 MG/1
40 TABLET, FILM COATED ORAL
Qty: 90 TAB | Refills: 1 | Status: SHIPPED | OUTPATIENT
Start: 2019-05-27

## 2019-05-27 RX ORDER — CHLORTHALIDONE 25 MG/1
25 TABLET ORAL DAILY
Qty: 90 TAB | Refills: 1 | Status: SHIPPED | OUTPATIENT
Start: 2019-05-28

## 2019-05-27 RX ORDER — AMLODIPINE BESYLATE 10 MG/1
10 TABLET ORAL DAILY
Qty: 90 TAB | Refills: 2 | Status: SHIPPED | OUTPATIENT
Start: 2019-05-28

## 2019-05-27 RX ORDER — LISINOPRIL 20 MG/1
20 TABLET ORAL DAILY
Qty: 30 TAB | Refills: 11 | Status: SHIPPED | OUTPATIENT
Start: 2019-05-27

## 2019-05-27 RX ORDER — LISINOPRIL 20 MG/1
20 TABLET ORAL DAILY
Qty: 30 TAB | Refills: 11 | Status: SHIPPED | OUTPATIENT
Start: 2019-05-27 | End: 2019-05-27

## 2019-05-27 RX ADMIN — ASPIRIN 81 MG 81 MG: 81 TABLET ORAL at 08:04

## 2019-05-27 RX ADMIN — HEPARIN SODIUM 5000 UNITS: 5000 INJECTION INTRAVENOUS; SUBCUTANEOUS at 14:07

## 2019-05-27 RX ADMIN — HEPARIN SODIUM 5000 UNITS: 5000 INJECTION INTRAVENOUS; SUBCUTANEOUS at 06:15

## 2019-05-27 RX ADMIN — FUROSEMIDE 40 MG: 10 INJECTION, SOLUTION INTRAMUSCULAR; INTRAVENOUS at 01:15

## 2019-05-27 RX ADMIN — METOPROLOL TARTRATE 50 MG: 50 TABLET ORAL at 08:04

## 2019-05-27 RX ADMIN — AMLODIPINE BESYLATE 10 MG: 5 TABLET ORAL at 08:04

## 2019-05-27 RX ADMIN — CHLORTHALIDONE 25 MG: 25 TABLET ORAL at 08:20

## 2019-05-27 RX ADMIN — FUROSEMIDE: 10 INJECTION, SOLUTION INTRAMUSCULAR; INTRAVENOUS at 03:00

## 2019-05-27 RX ADMIN — Medication 10 ML: at 06:15

## 2019-05-27 RX ADMIN — LISINOPRIL 20 MG: 20 TABLET ORAL at 16:33

## 2019-05-27 NOTE — PROGRESS NOTES
Patient resting in bed comfortably head to toes assessment are as charted. Generalized weakness noted. Falls prevention maintained. Patient denies pain at this time. Pain assessment on going. Care plan reviewed and patient expressed understanding. Call light and belongings within reach. Will continue to monitor.

## 2019-05-27 NOTE — PROGRESS NOTES
PEPE Plan:     Plan A: Home with outpatient PT/OT ([facility])     CM met with pt to discuss d/c planning needs. Pt verbalized understanding  that Outpatient PT/OT has been recommended at discharge. CM sent scripts and form w/ required paperwork to [address] [place] [address] via fax. CM spoke with pt concerning his PCP. He states that he sees Cadence Reeves NP and understands that he will need to follow-up within 7 days of d/c. CM unable to schedule appt due to holiday. [facility] paperwork placed in pt chart. Pt informed that his information has been sent and he can contact them if he does not hear from them within 2 days of d/c. Pt will be transported home via [facility] that his daughter will call for him. CM will continue to follow patient for discharge planning needs and arrange for services as deemed necessary. Care Management Interventions  PCP Verified by CM: Yes  Mode of Transport at Discharge:  Other (see comment)  Transition of Care Consult (CM Consult): Discharge Planning  Discharge Durable Medical Equipment: No  Physical Therapy Consult: Yes  Occupational Therapy Consult: Yes  Speech Therapy Consult: Yes  Current Support Network: Own Home, Lives with Spouse  Confirm Follow Up Transport: Cab([facility])  Plan discussed with Pt/Family/Caregiver: Yes  Discharge Location  Discharge Placement: Home with outpatient services(Outpatient PT/OT)    Samantha Alvarado, Care Manager  [phone]

## 2019-05-27 NOTE — PROGRESS NOTES
Patient discharged to home per MD order. Patient expressed understanding of discharged instructions and perceptions. IV removed. All of patient's belongings with patient.

## 2019-05-27 NOTE — DISCHARGE SUMMARY
Hospitalist Discharge Summary     Patient ID:  Amirah Wood  [id number]  35 y.o.  1960 5/24/2019    PCP on record: None    Admit date: 5/24/2019  Discharge date and time: 5/27/2019    DISCHARGE DIAGNOSIS:    Acute left MCA CVA (cerebral vascular accident) ([facility] [address] POA  Hypertensive emergency 238/144 POA, uncontrolled   Hypokalemia K POA   Mildly elevated troponin POA  MOISES prior on CPAP POA   Reported NSAID allergy POA  Morbid obesity   Systolic CHF    CONSULTATIONS:  IP CONSULT TO HOSPITALIST  IP CONSULT TO NEUROLOGY  IP CONSULT TO CARDIOLOGY    Excerpted HPI from H&P of Jt Greene MD:  CHIEF COMPLAINT: \"my right side has been weak for the past 2 days\"     HISTORY OF PRESENT ILLNESS:  27-year-old black male     Currently no PCP, has not seen a doctor in years  Has not taken any blood pressure medications in over 2 years  History of prior strokes approximately 10 years ago  Not taking aspirin regularly     Notes approximately 2 days ago he was \"feeling sick\"  Was having difficulty moving his right arm and leg.  They felt weak and heavy  Was able to walk but had to drag his right leg  No associated speech changes or visual changes or vertigo  Mild to moderate bilateral frontal headache x days  No chest pain, shortness of breath, nausea vomiting, diarrhea, abdominal pain, fevers, chills  Became concerned he was \"having a stroke\", came to the emergency room     Emergency room  Blood pressure markedly elevated to 238/144  4/5 right-sided weakness in arm and leg  Speech was fluent, smile symmetric  Head CT scan showed age-indeterminate infarct in the left basal ganglia          Also showed chronic infarct in the left corpus callosum   Started on nicardipine drip  Symptoms lasting greater than 2 days, not felt to be candidate for acute interventions by the ED staff  Patient listed with NSAID allergy but notes he is taking aspirin without problems  We will called to admit the patient        ______________________________________________________________________  DISCHARGE SUMMARY/HOSPITAL COURSE:  for full details see H&P, daily progress notes, labs, consult notes. Acute left MCA CVA (cerebral vascular accident) ([address] POA  Hypertensive emergency 238/144 POA, uncontrolled  Presented with right weakness x 2 days, improving     Admit head CT with age indeterminant infarct left basal ganglia, chronic infarcts as well:  CT scan brain :  1. Small age-indeterminate infarct in the left basal ganglia/internal capsule,  but new since 2012. Consider brain MRI for further evaluation. 2. 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 Mild  chronic microvascular ischemic disease.     MRI Small acute infarct in the left basal ganglia/internal capsule     MRA Brain without occlusion, CTA neck Mild atherosclerotic plaque formation at the origin of the  right ICA without flow-limiting stenosis. Mild stenosis origin of left vertebral  artery. Basilar and vertebral arteries relatively small, but patent. ,Echo TTE done , showed SYSTOLIC CHF,  Cardiology evaluated pt and added Lisinopril.       Stroke risk factors: HTN, tobacco, elevated cholesterol     Admitted  on nicardipine drip for SBP >238,   off cardene drip         Aspirin 81 daily, statin        HgBa1c  5.9 and lipid panel showed ldl 144, c/w statin      Neuro on board      C/w  Po norvasc and metoprolol, increase as needed     C/w prn hydralazine SBP > 170 as out of permissive HTN window     Dangers of untreated HTN d/w patient at length      Hypokalemia K POA  Replaced.  Check again     Mildly elevated troponin POA  Suspect due to marked HTN  BP control, troponin 0.06>0.06 , stable  EKG with TWI laterally     MOISES prior on CPAP POA  Not using at home  Referral as outpatient for sleep study  Can exacerbate HTN     Reported NSAID allergy POA  States he has taken aspirin without problems  Morbid obesity _______________________________________________________________________  Patient seen and examined by me on discharge day. Pertinent Findings:  Gen:    Not in distress  Chest: Clear lungs  CVS:   Regular rhythm. No edema  Abd:  Soft, not distended, not tender  Neuro:  Alert, orientedx3.  _______________________________________________________________________  DISCHARGE MEDICATIONS:   Current Discharge Medication List      START taking these medications    Details   amLODIPine (NORVASC) 10 mg tablet Take 1 Tab by mouth daily. Qty: 90 Tab, Refills: 2      aspirin 81 mg chewable tablet Take 1 Tab by mouth daily. Qty: 90 Tab, Refills: 1      atorvastatin (LIPITOR) 40 mg tablet Take 1 Tab by mouth nightly. Qty: 90 Tab, Refills: 1      chlorthalidone (HYGROTEN) 25 mg tablet Take 1 Tab by mouth daily. Qty: 90 Tab, Refills: 1      metoprolol tartrate (LOPRESSOR) 50 mg tablet Take 1 Tab by mouth every twelve (12) hours. Qty: 90 Tab, Refills: 1      lisinopril (PRINIVIL, ZESTRIL) 20 mg tablet Take 1 Tab by mouth daily. Qty: 30 Tab, Refills: 11         CONTINUE these medications which have NOT CHANGED    Details   acetaminophen (TYLENOL) 500 mg tablet Take 1,000 mg by mouth every six (6) hours as needed for Pain. aspirin/caffeine (BC PO) Take 1 Tab by mouth daily as needed for Pain (Aches). STOP taking these medications       ibuprofen (MOTRIN) 200 mg tablet Comments:   Reason for Stopping:         Multivit-Iron-Min-Folic Acid (CENTRUM COMPLETE) 18-0.4 mg Tab Comments:   Reason for Stopping:                 Patient Follow Up Instructions:    Activity: Activity as tolerated, no driving for 6 months  Diet: Cardiac Diet  Wound Care: None needed      Follow-up Information     Follow up With Specialties Details Why Contact Info    Ramona Klinefelter, MD Neurology In 2 weeks  [address]  [phone]      Adams Flynn NP Nurse Practitioner  Please schedule appt to be seen for a hospital follow-up within 7 days of your discharge [address] [facility]  [phone]      None    None (014) Patient stated that they have no PCP          ________________________________________________________________    Risk of deterioration: Low    Condition at Discharge:  Stable  __________________________________________________________________    Disposition  Home with family, no needs    ____________________________________________________________________    Code Status: Full Code  ___________________________________________________________________      Total time in minutes spent coordinating this discharge (includes going over instructions, follow-up, prescriptions, and preparing report for sign off to her PCP) :  50 minutes    Signed:  Behzad Wilder MD

## 2019-05-27 NOTE — DISCHARGE INSTRUCTIONS
HOSPITALIST DISCHARGE INSTRUCTIONS    NAME: Gomez Edwards   :  1960   MRN:  [id number]     Date/Time:  2019 10:51 AM    ADMIT DATE: 2019   DISCHARGE DATE: 2019     Acute left MCA CVA [facility]cerebral [facility] accident) ([facility] POA  Hypertensive emergency 238/144 POA,   Hypokalemia K POA   Mildly elevated troponin POA  MOISES prior on CPAP POA   Reported NSAID allergy POA  Morbid obesity   Systolic CHF    · It is important that you take the medication exactly as they are prescribed. · Keep your medication in the bottles provided by the pharmacist and keep a list of the medication names, dosages, and times to be taken in your wallet. · Do not take other medications without consulting your doctor. What to do at Home    Recommended diet:  Low fat, Low cholesterol    Recommended activity: Activity as tolerated, and no driving for 6 months      If you have questions regarding the hospital related prescriptions or hospital related issues please call [facility] at [phone]. You can always direct your questions to your primary care doctor if you are unable to reach your hospital physician; your PCP works as an extension of your hospital doctor just like your hospital doctor is an extension of your PCP for your time at the hospital [facility], [facility])    If you experience any of the following symptoms then please call your primary care physician or return to the emergency room if you cannot get hold of your doctor:    Fever, chills, nausea, vomiting, or persistent diarrhea  Worsening weakness or new problems with your speech or balance  Dark stools or visible blood in your stools  New Leg swelling or shortness of breath as these could be signs of a clot    Additional Instructions:      Bring these papers with you to your follow up appointments.  The papers will help your doctors be sure to continue the care plan from the hospital.              Information obtained by :  I understand that if any problems occur once I am at home I am to contact my physician. I understand and acknowledge receipt of the instructions indicated above.                                                                                                                                            Physician's or R.N.'s Signature                                                                  Date/Time                                                                                                                                              Patient or Representative Signature

## 2019-05-27 NOTE — PROGRESS NOTES
Consult dictated, [id number]. Newly diagnosed cardiomyopathy, probably hypertensive. Will put on meds and hopefully will have improvement. F/U in 3 months for reassessment and re-echo.     Signed By: Aravind Munguia MD     May 27, 2019

## 2019-05-28 NOTE — CONSULTS
[facility]    Name:  Duanne Dance  MR#:  [id number]  :  1960  ACCOUNT #:  [de-identified]  DATE OF SERVICE:  2019    REFERRING PHYSICIAN:  Dr. Antony Rashid:  Cardiomyopathy. HISTORY:  This is a 16-year-old male with a history of hypertensive heart disease, obesity, and reported NSAID allergy who was admitted with a complaint of right-sided weakness. He was found to have an acute left middle cerebral artery territory stroke. This was confirmed by imaging. In addition, an echocardiogram revealed a dilated left ventricle with globally reduced systolic function with an ejection fraction of approximately 20%-25%. This is a new diagnosis for him. Currently, he denies chest, arm, jaw, neck, shoulder discomfort. No reported orthopnea or paroxysmal nocturnal dyspnea. No lower extremity edema. Of note, he has not taken his blood pressure medications in over 2 years. He has had a history of prior strokes over a decade ago. ALLERGIES:  NSAIDS (WHEEZING). MEDICATIONS:  1. Amlodipine 10 mg daily. 2.  Aspirin 81 mg daily. 3.  Lipitor 40 mg at bedtime. 4.  Chlorthalidone 25 mg daily. 5.  Heparin subcutaneously 5000 units every 8 hours. 6.  Metoprolol Tartrate 50 mg twice daily. PAST MEDICAL HISTORY:  1. As above. 2.  Obstructive sleep apnea. FAMILY HISTORY:  Noncontributory. SOCIAL HISTORY:  Former smoker. Denies alcohol use. . REVIEW OF SYSTEMS:  Complete review of systems was obtained from the patient except as noted above and below, is negative across 12 body systems. Positive for nausea, unilateral weakness, frontal headache. PHYSICAL EXAMINATION:  GENERAL:  Nondiaphoretic, in no acute distress. VITAL SIGNS:  Temperature 98.1, pulse 61, blood pressure 152/101, respirations 16, oxygen saturation 99% on room air. HEENT:  No scleral icterus, mucous membranes are moist, conjunctivae are pink.   NECK: Supple. No palpable thyromegaly. RESPIRATORY:  Unlabored. Clear to auscultation bilaterally, symmetric air movement. CARDIAC:  Regular rate and rhythm. No rub, gallop. No jugular venous distention or pedal edema. Palpable radial pulses bilaterally. ABDOMEN:  Soft, nontender, nondistended. EXTREMITIES:  Without cyanosis or clubbing. NEUROLOGIC:  Grossly nonfocal in the face. He does have some right-sided weakness. LABORATORY DATA:  Labs were reviewed. An echocardiogram preliminarily reveals an ejection fraction of 20%-25% with left atrial dilation, moderate mitral valve regurgitation and mild pulmonary hypertension. No pericardial effusions noted. There is mild-to-moderate left ventricular hypertrophy that is concentric. ECG, this study was done recently, shows normal sinus rhythm at a rate of 85 beats per minute with left ventricular hypertrophy and repolarization changes. The QT interval was mildly prolonged corrected for the rate. A CTA of the head and neck on 05/26/2019 does not reveal significant intracranial vascular disease. The carotid arteries are okay. An MRI on 05/25/2019 reveals a small left basal ganglia lacunar infarct. No hemorrhage noted. No large vessel occlusion. IMPRESSION:  1. Newly diagnosed cardiomyopathy. Echo ejection fraction 20%-25%. Likely this is secondary to uncontrolled hypertension. Too early to tell. 2.  Chronic systolic congestive heart failure, [place] Heart Association congestive heart failure class II. 3.  Acute stroke with residual right-sided weakness. This involved the left middle cerebral artery territory back in the left basal ganglia and internal capsule. No hemorrhage. 4.  Hypertensive heart disease with heart failure. 5.  Obstructive sleep apnea. 6.  Obesity. 7.  Former smoker. RECOMMENDATIONS:  1. He is already on a beta-blocker. 2.  I will start lisinopril 20 mg daily.   There is room from a blood pressure standpoint to do this. 3.  Continue amlodipine and chlorthalidone for antihypertensive coverage. 4.  I have asked him to follow up in 3 months in the office. At that time, we will survey how things are going for him and probably recheck an echo. If the echo suggests persistent severe left ventricular systolic dysfunction, then I will recommend a prophylactic ICD at that time. Hope that his heart improves on medical therapy.           Pierre Marin MD      [id number]  D:  05/27/2019 16:11  T:  05/27/2019 16:16  JOB #:  [id number]

## 2021-06-30 ENCOUNTER — TRANSCRIBE ORDER (OUTPATIENT)
Dept: SCHEDULING | Age: 61
End: 2021-06-30

## 2021-06-30 DIAGNOSIS — I42.9 CARDIOMYOPATHY (HCC): ICD-10-CM

## 2021-06-30 DIAGNOSIS — R06.02 SHORTNESS OF BREATH: Primary | ICD-10-CM

## 2021-07-06 ENCOUNTER — HOSPITAL ENCOUNTER (OUTPATIENT)
Dept: NON INVASIVE DIAGNOSTICS | Age: 61
Discharge: HOME | End: 2021-07-06
Attending: INTERNAL MEDICINE
Payer: MEDICARE

## 2021-07-06 DIAGNOSIS — I42.9 CARDIOMYOPATHY (HCC): ICD-10-CM

## 2021-07-06 DIAGNOSIS — R06.02 SHORTNESS OF BREATH: ICD-10-CM

## 2021-07-06 LAB
ECHO AO ROOT DIAM: 2.97 CM
ECHO AV AREA PLAN: 3.18 CM2
ECHO EST RA PRESSURE: 5 MMHG
ECHO LA AREA 4C: 18.83 CM2
ECHO LA MAJOR AXIS: 3.39 CM
ECHO LA VOL 4C: 59.34 ML (ref 18–58)
ECHO LV EDV A4C: 163.82 ML
ECHO LV EJECTION FRACTION A4C: 74 PERCENT
ECHO LV ESV A4C: 42.88 ML
ECHO LV INTERNAL DIMENSION DIASTOLIC: 4.83 CM (ref 4.2–5.9)
ECHO LV INTERNAL DIMENSION SYSTOLIC: 3.34 CM
ECHO LV IVSD: 1.11 CM (ref 0.6–1)
ECHO LV MASS 2D: 222.6 G (ref 88–224)
ECHO LV POSTERIOR WALL DIASTOLIC: 1.3 CM (ref 0.6–1)
ECHO LVOT DIAM: 1.87 CM
ECHO LVOT PEAK GRADIENT: 5.05 MMHG
ECHO LVOT PEAK VELOCITY: 112.17 CM/S
ECHO MV A VELOCITY: 40.62 CM/S
ECHO MV AREA PHT: 4.57 CM2
ECHO MV AREA PLAN: 5.86 CM2
ECHO MV E DECELERATION TIME (DT): 165.99 MS
ECHO MV E VELOCITY: 32.02 CM/S
ECHO MV E/A RATIO: 0.79
ECHO MV PRESSURE HALF TIME (PHT): 48.14 MS
ECHO PV PEAK INSTANTANEOUS GRADIENT SYSTOLIC: 2.71 MMHG
ECHO RA AREA 4C: 16.41 CM2
ECHO RIGHT VENTRICULAR SYSTOLIC PRESSURE (RVSP): 22.57 MMHG
ECHO TV REGURGITANT MAX VELOCITY: 103.41 CM/S
ECHO TV REGURGITANT MAX VELOCITY: 209.13 CM/S
ECHO TV REGURGITANT MAX VELOCITY: 475.82 CM/S
ECHO TV REGURGITANT PEAK GRADIENT: 17.57 MMHG

## 2021-07-06 PROCEDURE — 93306 TTE W/DOPPLER COMPLETE: CPT

## 2021-07-06 PROCEDURE — 93306 TTE W/DOPPLER COMPLETE: CPT | Performed by: SPECIALIST

## 2022-03-18 PROBLEM — I63.9 STROKE (CEREBRUM) (HCC): Status: ACTIVE | Noted: 2019-05-24

## 2023-05-10 RX ORDER — AMLODIPINE BESYLATE 10 MG/1
TABLET ORAL DAILY
COMMUNITY
Start: 2019-05-28

## 2023-05-10 RX ORDER — ASPIRIN 81 MG/1
TABLET, CHEWABLE ORAL DAILY
COMMUNITY
Start: 2019-05-28

## 2023-05-10 RX ORDER — ATORVASTATIN CALCIUM 40 MG/1
TABLET, FILM COATED ORAL
COMMUNITY
Start: 2019-05-27

## 2023-05-10 RX ORDER — ACETAMINOPHEN 500 MG
TABLET ORAL EVERY 6 HOURS PRN
COMMUNITY

## 2023-05-10 RX ORDER — METOPROLOL TARTRATE 50 MG/1
TABLET, FILM COATED ORAL EVERY 12 HOURS
COMMUNITY
Start: 2019-05-27

## 2023-05-10 RX ORDER — LISINOPRIL 20 MG/1
TABLET ORAL DAILY
COMMUNITY
Start: 2019-05-27

## 2023-05-10 RX ORDER — CHLORTHALIDONE 25 MG/1
TABLET ORAL DAILY
COMMUNITY
Start: 2019-05-28

## 2024-10-23 ENCOUNTER — HOSPITAL ENCOUNTER (EMERGENCY)
Facility: HOSPITAL | Age: 64
Discharge: HOME | End: 2024-10-23
Attending: EMERGENCY MEDICINE
Payer: MEDICARE

## 2024-10-23 VITALS
HEART RATE: 61 BPM | OXYGEN SATURATION: 98 % | RESPIRATION RATE: 18 BRPM | TEMPERATURE: 98.3 F | SYSTOLIC BLOOD PRESSURE: 176 MMHG | DIASTOLIC BLOOD PRESSURE: 93 MMHG

## 2024-10-23 DIAGNOSIS — I10 HYPERTENSION, UNSPECIFIED TYPE: Primary | ICD-10-CM

## 2024-10-23 LAB
ALBUMIN SERPL-MCNC: 3.6 G/DL (ref 3.5–5)
ALBUMIN/GLOB SERPL: 0.9 (ref 1.1–2.2)
ALP SERPL-CCNC: 60 U/L (ref 45–117)
ALT SERPL-CCNC: 37 U/L (ref 12–78)
ANION GAP SERPL CALC-SCNC: 4 MMOL/L (ref 2–12)
AST SERPL-CCNC: 26 U/L (ref 15–37)
BASOPHILS # BLD: 0 K/UL (ref 0–0.1)
BASOPHILS NFR BLD: 1 % (ref 0–1)
BILIRUB SERPL-MCNC: 0.4 MG/DL (ref 0.2–1)
BUN SERPL-MCNC: 18 MG/DL (ref 6–20)
BUN/CREAT SERPL: 18 (ref 12–20)
CALCIUM SERPL-MCNC: 10.3 MG/DL (ref 8.5–10.1)
CHLORIDE SERPL-SCNC: 105 MMOL/L (ref 97–108)
CO2 SERPL-SCNC: 30 MMOL/L (ref 21–32)
COMMENT:: NORMAL
CREAT SERPL-MCNC: 0.99 MG/DL (ref 0.7–1.3)
DIFFERENTIAL METHOD BLD: ABNORMAL
EKG ATRIAL RATE: 67 BPM
EKG DIAGNOSIS: NORMAL
EKG P AXIS: 44 DEGREES
EKG P-R INTERVAL: 172 MS
EKG Q-T INTERVAL: 396 MS
EKG QRS DURATION: 84 MS
EKG QTC CALCULATION (BAZETT): 418 MS
EKG R AXIS: 53 DEGREES
EKG T AXIS: 40 DEGREES
EKG VENTRICULAR RATE: 67 BPM
EOSINOPHIL # BLD: 0.3 K/UL (ref 0–0.4)
EOSINOPHIL NFR BLD: 6 % (ref 0–7)
ERYTHROCYTE [DISTWIDTH] IN BLOOD BY AUTOMATED COUNT: 15 % (ref 11.5–14.5)
GLOBULIN SER CALC-MCNC: 3.9 G/DL (ref 2–4)
GLUCOSE SERPL-MCNC: 96 MG/DL (ref 65–100)
HCT VFR BLD AUTO: 42.3 % (ref 36.6–50.3)
HGB BLD-MCNC: 14.1 G/DL (ref 12.1–17)
IMM GRANULOCYTES # BLD AUTO: 0 K/UL (ref 0–0.04)
IMM GRANULOCYTES NFR BLD AUTO: 0 % (ref 0–0.5)
LYMPHOCYTES # BLD: 1.5 K/UL (ref 0.8–3.5)
LYMPHOCYTES NFR BLD: 33 % (ref 12–49)
MCH RBC QN AUTO: 28.8 PG (ref 26–34)
MCHC RBC AUTO-ENTMCNC: 33.3 G/DL (ref 30–36.5)
MCV RBC AUTO: 86.3 FL (ref 80–99)
MONOCYTES # BLD: 0.5 K/UL (ref 0–1)
MONOCYTES NFR BLD: 11 % (ref 5–13)
NEUTS SEG # BLD: 2.4 K/UL (ref 1.8–8)
NEUTS SEG NFR BLD: 49 % (ref 32–75)
NRBC # BLD: 0 K/UL (ref 0–0.01)
NRBC BLD-RTO: 0 PER 100 WBC
PLATELET # BLD AUTO: 204 K/UL (ref 150–400)
PMV BLD AUTO: 9.8 FL (ref 8.9–12.9)
POTASSIUM SERPL-SCNC: 3.2 MMOL/L (ref 3.5–5.1)
PROT SERPL-MCNC: 7.5 G/DL (ref 6.4–8.2)
RBC # BLD AUTO: 4.9 M/UL (ref 4.1–5.7)
SODIUM SERPL-SCNC: 139 MMOL/L (ref 136–145)
SPECIMEN HOLD: NORMAL
WBC # BLD AUTO: 4.7 K/UL (ref 4.1–11.1)

## 2024-10-23 PROCEDURE — 99284 EMERGENCY DEPT VISIT MOD MDM: CPT

## 2024-10-23 PROCEDURE — 36415 COLL VENOUS BLD VENIPUNCTURE: CPT

## 2024-10-23 PROCEDURE — 6370000000 HC RX 637 (ALT 250 FOR IP): Performed by: EMERGENCY MEDICINE

## 2024-10-23 PROCEDURE — 93005 ELECTROCARDIOGRAM TRACING: CPT | Performed by: EMERGENCY MEDICINE

## 2024-10-23 PROCEDURE — 80053 COMPREHEN METABOLIC PANEL: CPT

## 2024-10-23 PROCEDURE — 85025 COMPLETE CBC W/AUTO DIFF WBC: CPT

## 2024-10-23 PROCEDURE — 93010 ELECTROCARDIOGRAM REPORT: CPT | Performed by: SPECIALIST

## 2024-10-23 RX ORDER — METOPROLOL TARTRATE 50 MG
50 TABLET ORAL
Status: COMPLETED | OUTPATIENT
Start: 2024-10-23 | End: 2024-10-23

## 2024-10-23 RX ORDER — LISINOPRIL 20 MG/1
20 TABLET ORAL DAILY
Qty: 30 TABLET | Refills: 0 | Status: SHIPPED | OUTPATIENT
Start: 2024-10-23 | End: 2024-10-23

## 2024-10-23 RX ORDER — AMLODIPINE BESYLATE 10 MG/1
10 TABLET ORAL DAILY
Qty: 30 TABLET | Refills: 0 | Status: SHIPPED | OUTPATIENT
Start: 2024-10-23

## 2024-10-23 RX ORDER — METOPROLOL TARTRATE 50 MG
50 TABLET ORAL 2 TIMES DAILY
Qty: 60 TABLET | Refills: 0 | Status: SHIPPED | OUTPATIENT
Start: 2024-10-23 | End: 2024-10-23

## 2024-10-23 RX ORDER — CHLORTHALIDONE 25 MG/1
25 TABLET ORAL DAILY
Qty: 30 TABLET | Refills: 3 | Status: SHIPPED | OUTPATIENT
Start: 2024-10-23

## 2024-10-23 RX ORDER — AMLODIPINE BESYLATE 10 MG/1
10 TABLET ORAL DAILY
Qty: 30 TABLET | Refills: 0 | Status: SHIPPED | OUTPATIENT
Start: 2024-10-23 | End: 2024-10-23

## 2024-10-23 RX ORDER — METOPROLOL TARTRATE 50 MG
50 TABLET ORAL 2 TIMES DAILY
Qty: 60 TABLET | Refills: 0 | Status: SHIPPED | OUTPATIENT
Start: 2024-10-23

## 2024-10-23 RX ORDER — CHLORTHALIDONE 25 MG/1
25 TABLET ORAL DAILY
Qty: 30 TABLET | Refills: 3 | Status: SHIPPED | OUTPATIENT
Start: 2024-10-23 | End: 2024-10-23

## 2024-10-23 RX ORDER — AMLODIPINE BESYLATE 5 MG/1
10 TABLET ORAL
Status: COMPLETED | OUTPATIENT
Start: 2024-10-23 | End: 2024-10-23

## 2024-10-23 RX ORDER — LISINOPRIL 20 MG/1
20 TABLET ORAL DAILY
Qty: 30 TABLET | Refills: 0 | Status: SHIPPED | OUTPATIENT
Start: 2024-10-23

## 2024-10-23 RX ORDER — LISINOPRIL 20 MG/1
20 TABLET ORAL
Status: COMPLETED | OUTPATIENT
Start: 2024-10-23 | End: 2024-10-23

## 2024-10-23 RX ORDER — CHLORTHALIDONE 25 MG/1
25 TABLET ORAL
Status: COMPLETED | OUTPATIENT
Start: 2024-10-23 | End: 2024-10-23

## 2024-10-23 RX ADMIN — LISINOPRIL 20 MG: 20 TABLET ORAL at 19:10

## 2024-10-23 RX ADMIN — METOPROLOL TARTRATE 50 MG: 50 TABLET, FILM COATED ORAL at 19:10

## 2024-10-23 RX ADMIN — CHLORTHALIDONE 25 MG: 25 TABLET ORAL at 19:10

## 2024-10-23 RX ADMIN — AMLODIPINE BESYLATE 10 MG: 5 TABLET ORAL at 19:09

## 2024-10-23 ASSESSMENT — PAIN - FUNCTIONAL ASSESSMENT: PAIN_FUNCTIONAL_ASSESSMENT: NONE - DENIES PAIN

## 2024-10-23 NOTE — ED TRIAGE NOTES
Patient arrives via EMS from home for hypertension. He ran out of his blood pressure and when he went to get it filled [facility] checked his pressure, it was elevated so they called EMS for him to be checked out before they would fill it.

## 2024-10-23 NOTE — ED PROVIDER NOTES
not have evidence of hypertensive emergency.  Plan to refill his medications with instructions for him to follow-up with his primary care doctor, return if worsens.    Amount and/or Complexity of Data Reviewed  External Data Reviewed: labs and notes.  Labs: ordered.  ECG/medicine tests: ordered and independent interpretation performed. Decision-making details documented in ED Course.    Risk  Prescription drug management.            REASSESSMENT     ED Course as of 10/23/24 1820   Wed Oct 23, 2024   1725 EKG Rhythm Strip  ED EKG Interpretation:  Time: 5:26 PM  Rhythm: normal sinus rhythm and PAC's; and regular . Rate (approx.): 67; Axis: normal; P wave: normal; QRS interval: normal ; ST/T wave: normal; Other findings: otherwise normal.  EKG interpreted by Lola Fernandez MD.  [CH]      ED Course User Index  [CH] Lola Fernandez MD         CONSULTS:  None    PROCEDURES:     Procedures            (Please note that portions of this note were completed with a voice recognition program.  Efforts were made to edit the dictations but occasionally words are mis-transcribed.)    Bebeto Martines MD (electronically signed)  Emergency Attending Physician              Bebeto Martines MD  10/23/24 1822